# Patient Record
Sex: MALE | Race: WHITE
[De-identification: names, ages, dates, MRNs, and addresses within clinical notes are randomized per-mention and may not be internally consistent; named-entity substitution may affect disease eponyms.]

---

## 2017-07-11 ENCOUNTER — HOSPITAL ENCOUNTER (OUTPATIENT)
Dept: HOSPITAL 84 - D.MRI | Age: 62
Discharge: HOME | End: 2017-07-11
Attending: FAMILY MEDICINE
Payer: MEDICARE

## 2017-07-11 VITALS — BODY MASS INDEX: 45.4 KG/M2

## 2017-07-11 DIAGNOSIS — M54.16: Primary | ICD-10-CM

## 2017-08-11 ENCOUNTER — HOSPITAL ENCOUNTER (EMERGENCY)
Dept: HOSPITAL 84 - D.ER | Age: 62
LOS: 1 days | Discharge: HOME | End: 2017-08-12
Payer: MEDICARE

## 2017-08-11 VITALS — BODY MASS INDEX: 45.4 KG/M2
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
## 2017-08-11 DIAGNOSIS — I10: ICD-10-CM

## 2017-08-11 DIAGNOSIS — S42.001A: Primary | ICD-10-CM
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
## 2017-08-11 DIAGNOSIS — E11.9: ICD-10-CM

## 2017-08-11 DIAGNOSIS — I25.10: ICD-10-CM
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
## 2017-09-13 ENCOUNTER — HOSPITAL ENCOUNTER (INPATIENT)
Dept: HOSPITAL 84 - D.ER | Age: 62
LOS: 1 days | Discharge: HOME | DRG: 249 | End: 2017-09-14
Attending: FAMILY MEDICINE | Admitting: FAMILY MEDICINE
Payer: MEDICARE

## 2017-09-13 VITALS — BODY MASS INDEX: 44.27 KG/M2 | WEIGHT: 292.1 LBS | HEIGHT: 68 IN | BODY MASS INDEX: 44.27 KG/M2

## 2017-09-13 VITALS — DIASTOLIC BLOOD PRESSURE: 63 MMHG | SYSTOLIC BLOOD PRESSURE: 109 MMHG

## 2017-09-13 DIAGNOSIS — Z95.5: ICD-10-CM

## 2017-09-13 DIAGNOSIS — D63.8: ICD-10-CM

## 2017-09-13 DIAGNOSIS — I10: ICD-10-CM

## 2017-09-13 DIAGNOSIS — R00.2: ICD-10-CM

## 2017-09-13 DIAGNOSIS — E11.65: ICD-10-CM

## 2017-09-13 DIAGNOSIS — E66.01: ICD-10-CM

## 2017-09-13 DIAGNOSIS — R00.0: ICD-10-CM

## 2017-09-13 DIAGNOSIS — K21.9: ICD-10-CM

## 2017-09-13 DIAGNOSIS — I48.2: ICD-10-CM

## 2017-09-13 DIAGNOSIS — I48.92: ICD-10-CM

## 2017-09-13 DIAGNOSIS — I25.119: Primary | ICD-10-CM

## 2017-09-13 DIAGNOSIS — Z87.891: ICD-10-CM

## 2017-09-13 LAB
ALBUMIN SERPL-MCNC: 3.1 G/DL (ref 3.4–5)
ALP SERPL-CCNC: 82 U/L (ref 46–116)
ALT SERPL-CCNC: 18 U/L (ref 10–68)
ANION GAP SERPL CALC-SCNC: 10.3 MMOL/L (ref 8–16)
BASOPHILS NFR BLD AUTO: 0.3 % (ref 0–2)
BILIRUB SERPL-MCNC: 0.4 MG/DL (ref 0.2–1.3)
BUN SERPL-MCNC: 27 MG/DL (ref 7–18)
CALCIUM SERPL-MCNC: 8.6 MG/DL (ref 8.5–10.1)
CHLORIDE SERPL-SCNC: 109 MMOL/L (ref 98–107)
CK MB SERPL-MCNC: 1.3 U/L (ref 0–3.6)
CK SERPL-CCNC: 66 UL (ref 21–232)
CK SERPL-CCNC: 70 UL (ref 21–232)
CO2 SERPL-SCNC: 26.1 MMOL/L (ref 21–32)
CREAT SERPL-MCNC: 1 MG/DL (ref 0.6–1.3)
EOSINOPHIL NFR BLD: 4.4 % (ref 0–7)
ERYTHROCYTE [DISTWIDTH] IN BLOOD BY AUTOMATED COUNT: 14.6 % (ref 11.5–14.5)
GLOBULIN SER-MCNC: 3.2 G/L
GLUCOSE SERPL-MCNC: 130 MG/DL (ref 74–106)
HCT VFR BLD CALC: 39.9 % (ref 42–54)
HGB BLD-MCNC: 13.4 G/DL (ref 13.5–17.5)
IMM GRANULOCYTES NFR BLD: 0 % (ref 0–5)
LYMPHOCYTES NFR BLD AUTO: 27.9 % (ref 15–50)
MAGNESIUM SERPL-MCNC: 1.7 MG/DL (ref 1.8–2.4)
MCH RBC QN AUTO: 30.6 PG (ref 26–34)
MCHC RBC AUTO-ENTMCNC: 33.6 G/DL (ref 31–37)
MCV RBC: 91.1 FL (ref 80–100)
MONOCYTES NFR BLD: 9.6 % (ref 2–11)
NEUTROPHILS NFR BLD AUTO: 57.8 % (ref 40–80)
OSMOLALITY SERPL CALC.SUM OF ELEC: 287 MOSM/KG (ref 275–300)
PLATELET # BLD: 186 10X3/UL (ref 130–400)
PMV BLD AUTO: 10.3 FL (ref 7.4–10.4)
POTASSIUM SERPL-SCNC: 4.4 MMOL/L (ref 3.5–5.1)
PROT SERPL-MCNC: 6.3 G/DL (ref 6.4–8.2)
RBC # BLD AUTO: 4.38 10X6/UL (ref 4.2–6.1)
SODIUM SERPL-SCNC: 141 MMOL/L (ref 136–145)
TROPONIN I SERPL-MCNC: < 0.017 NG/ML (ref 0–0.06)
TROPONIN I SERPL-MCNC: < 0.017 NG/ML (ref 0–0.06)
WBC # BLD AUTO: 8.7 10X3/UL (ref 4.8–10.8)

## 2017-09-13 NOTE — NUR
ARRIVED TO FLOOR VIA WHEELCHAIR, ACCOMPANIED BY HOSPITAL STAFF.  LEFT FOREARM
INFUSING CARDIZEM @ 15.  ORIEINTED TO UNIT AND PLACED ON TELEMETRY.  CALL
LIGHT IN REACH.  WILL CONTINUE TO MONITOR.  SEE NURSE ASSEMENT.

## 2017-09-13 NOTE — HEMODYNAMI
PATIENT:FREDI LAWSON                                    MEDICAL RECORD: [id number]
: 51                                            [address]
[id number]                                       ADMISSION DATE: 17
 
 
 Generatedon:201713:38
Patient name: FREDI LAWSON Patient #: [id number] Visit #: [id number] SSN: : 
1955 Date of
study: 2017
Page: Of
Hemodynamic Procedure Report
****************************
Patient Data
Patient Demographics
Procedure consent was obtained
First Name: FREDI           Gender: Male
Last Name: RENNY            : 1955
Middle Initial: G           Age: 62 year(s)
Patient #: [id number]       Race: Unknown
Visit #: [id number]
Accession #:
[id number]
Additional ID: [id number]
Contact details
Address: [address] [place] [address]  Phone: [phone]
State: [place]
City: [place]
Zip code: [address]
Past Medical History
Allergies: No known allergies
Admission
Admission Data
Admission Date: 2017   Admission Time: 19:05
Room #: 2115
Procedure
Procedure Types
Cath Procedure
Diagnostic Procedure
LHC
C w/Coronaries
FFR/IVUS
Intra-Coronary IVUS Initial
PCI Procedure
Coronary Stent Initial
Miscellaneous Procedures
Moderate Sedation up to 30 minutes
Procedure Description
Procedure Date
Procedure Date: 2017
Procedure Start Time: 13:19
Procedure End Time: 13:38
Procedure Staff
Name                            Function
Fredy Roman MD                Performing Physician
Yamileth Heart RT               Scrub
Anish Rasmussen RT                  Scrub
Kyle Morin RN                Nurse
Inge Perez RT             Monitor
Procedure Data
 
Cath Procedure
Fluoroscopy
Diagnostic fluoroscopy      Total fluoroscopy Time: 3
time: 3 min                 min
Diagnostic fluoroscopy      Total fluoroscopy dose:
dose: 1478 mGy              1478 mGy
Contrast Material
Contrast Material Type                       Amount (ml)
Isovue 300                                   89
Entry Location
Entry     Primary  Successful  Side  Size  Upsize Upsize Entry    Closure Succes
sful  Closure
Location                             (Fr)  1 (Fr) 2 (Fr) Remarks  Device        
      Remarks
Femoral                        Right 5 Fr  6 Fr                   Exoseal
artery                                     Short
Estimated blood loss: 10 ml
Diagnostic catheters
Device Type               Used For           End Catheter
Placement
Cordis 5Fr Pigtail        LV Angiography
Catheter (MP)
Cordis 5Fr JL 4.0         Left Coronary
Catheter (MP)             Angiography
Cordis 5Fr 3DRC Catheter  Right Coronary
(MP)                      Angiography
Procedure Complications
No complications
Procedure Medications
Medication           Administration Route Dosage
Oxygen               NC                   2 l/min
Heparin Flush Bag    added to field       2 bags
(1000units/500ml NS)
0.9% NaCl            I.V.                 100 ml/hr
Plavix               P.O.                 600 mg
Fentanyl             I.V.                 50 mcg
Versed               I.V.                 1 mg
Fentanyl             I.V.                 50 mcg
Versed               I.V.                 1 mg
Integrilin (Bolus    I.V.                 11.3 ml
2mg/ml)
Integrilin (Bolus    wasted               8.7 ml
2mg/ml)
Heparin Bolus        I.V.                 4000 units
Hemodynamics
Rest
Heart Rate: 57 (bpm)
Snapshots
Pre Cath      Intra         NCS           Post Cath
Vital Signs
Time     Heart  Resp   SPO2 etCO2   VQ1hqdd NIBP (mmHg) Rhythm  Pain    Sedation
Rate   (ipm)  (%)  (mmHg)  (mmHg)                      Status  Level
(bpm)
13:12:46 56     18     98   0       0       127/74(93)  NSR     0 (11)  10(A)
, No
pain
13:17:31 56     19     97   0       0       137/77(98)  NSR     0 (11)  9(A)
, No
pain
13:22:12 53     18     96   0       0       114/68(86)  NSR     0 (11)  9(A)
 
, No
pain
13:26:55 52     18     94   0       0       134/71(96)  NSR     0 (11)  9(A)
, No
pain
13:31:39 50     17     95   0       0       124/63(93)  NSR     0 (11)  9(A)
, No
pain
13:36:24 51     15     91   0       0       128/77(108) NSR     0 (11)  9(A)
, No
pain
Medications
Time     Medication       Route  Dose  Verified Delivered Reason          Notes 
 Effectiveness
by       by
13:12:11 Oxygen           NC     2     Kyle Wright    Per physician
l/min Mikel Morin RN
RN
13:12:20 Heparin Flush    added  2     Kyle Wright    used for
Bag              to     bags  Mikel Morin RN procedure
(1000units/500ml field        RN
NS)
13:12:29 0.9% NaCl        I.V.   100   Kyle Wright    Per physician
ml/hr Mikel Morin RN
RN
13:12:40 Plavix           P.O.   600   Kyle Wright    for
mg    Morin   Morin RN antiplatelet
RN                 therapy
13:16:36 Fentanyl         I.V.   50    Kyle   Kyle    for sedation
mcg   Mikel Morin RN
RN
13:16:43 Versed           I.V.   1 mg  Kyle   Kyle    for sedation
Mikel Morin RN
RN
13:22:39 Fentanyl         I.V.   50    Kyle   Kyle    for sedation
mcg   Mikel Morin RN
RN
13:22:44 Versed           I.V.   1 mg  Kyle   Kyle    for sedation
Mikel Morin RN
RN
13:30:45 Heparin Bolus    I.V.   4000  Kyle   Kyle    for
units Mikel Morin RN anticoagulation
RN
13:31:40 Integrilin       I.V.   11.3  Kyle   Kyle    for
(Bolus 2mg/ml)          ml    Mikel Morin RN antiplatelet
RN                 therapy
13:31:52 Integrilin       wasted 8.7   Kyle   Kyle    for
(Bolus 2mg/ml)          ml    Mikel Morin RN antiplatelet
RN                 therapy
Procedure Log
Time     Note
12:50:02 Anish Rasmussen RT(R) sent for patient. Start room use.
13:03:11 Time tracking: Regular hours
13:03:16 Plan of Care:Hemodynamics will remain stable., Cardiac
rhythm will remain stable., Comfort level will be
maintained., Respiratory function will remain
adequate., Patient/ family verbilizes understanding of
procedure., Procedure tolerated without complication.,
Recovers from procedure without complications..
13:10:56 Patient received from PCU to CCL 1 Alert and oriented.
 
Tansferred to table in Supine position.
13:10:57 Warm blankets applied, and marisa hugger turned on for
patient comfort.
13:10:57 Correct patient and procedure confirmed by team.
13:10:58 Signed procedure consent form obtained from patient.
13:10:59 ECG and BP/O2 sat monitors applied to patient.
13:11:00 Vital chart was started
13:11:03 Rhythm: sinus rhythm
13:11:05 Full Disclosure recording started
13:11:18 H&P Date Dictated: 2017 Within 30 days and on
chart..
13:11:19 Pre-procedure instructions explained to patient.
13:11:20 Pre-op teaching completed and patient verbalized
understanding.
13:11:21 Family in waiting room.
13:11:23 Patient NPO since Midnight.
13:11:30 Patient allergic to No known allergies
13:11:33 Is the patient allergic to Iodine/contrast media? No.
13:11:35 Is patient on blood thinner?No
13:11:41 Patient diabetic? No.
13:11:44 Previous problem with sedation/anesthesia? No ?
13:11:45 Snore? Yes
13:11:46 Sleep apnea? Yes
13:11:47 Deviated septum? No
13:11:47 Opens mouth fully? Yes
13:11:48 Sticks out tongue? Yes
13:11:50 Airway obstruction? No ?
13:11:51 Dentures? No ?
13:11:54 Pre procedure: right dorsailis pedis pulse 2+ Normal;
easily identifiable; not easily obliterated
13:11:56 Patient pain scale 0/10 ?.
13:12:05 IV patent on arrival in left forearm with 0.9% NaCl at
[facility].
13:12:10 Lab results completed and on chart.
13:12:11 Oxygen 2 l/min NC was administered by Kyle Morin
RN; Per physician;
13:12:14 Right groin area was prepped with chlora-prep and
draped in sterile fashion
13:12:14 Alarms reviewed by R. N.
13:12:15 Sharps counted by scrub and verified by R.N.
13:12:18 Use device set Femoral Dx
13:12:19 Acist Syringe opened to sterile field.
13:12:20 Heparin Flush Bag (1000units/500ml NS) 2 bags added to
field was administered by Kyle Morin RN; used for
procedure;
13:12:26 Bag Decanter opened to sterile field.
13:12:26 Medline Cath Pack opened to sterile field.
13:12:27 Terumo 5Fr [place] Sheath opened to sterile field.
13:12:27 St Gurjit 260cm J .035 wire opened to sterile field.
13:12:28 Acist Hand Control opened to sterile field.
13:12:29 0.9% NaCl 100 ml/hr I.V. was administered by Kyle Morin RN; Per physician;
13:12:29 Acist Manifold opened to sterile field.
13:12:29 Diagnostic Infinity 5Fr Multipack catheter opened to
sterile field.
13:12:30 Tegaderm 4 x 4 opened to sterile field.
13:12:40 Plavix 600 mg P.O. was administered by Kyle Morin
RN; for antiplatelet therapy;
13:14:33 Final Timeout: patient, procedure, and site verified
with staff and physician. All members of the team are
 
in agreement.
13:14:38 Right groin site verified by team.
13:14:40 Physical assessment completed. ASA score P 2 - A
patient with mild systemic disease as per Fredy Roman MD.
13:14:43 Sedation plan: IV Moderate Sedation Versed, Fentanyl
13:16:26 Baseline sample Acquired.
13:16:28 Zero performed for pressure channel P1
13:16:36 Fentanyl 50 mcg I.V. was administered by Kyle Morin
RN; for sedation;
::43 Versed 1 mg I.V. was administered by Kyle Morin RN;
for sedation;
13:19:35 Procedure started.
13:19:38 Local anesthetic to right femoral artery with
Lidocaine 2% by Fredy Roman MD.**INITIAL ACCESS
ONLY**
13:20:08 A 5 Fr sheath was inserted into the Right Femoral
artery
13:20:28 A Cordis 5Fr Pigtail Catheter (MP) was advanced over
the wire and used for LV Angiography.
13:21:00 LV gram done using ANGELO
13:21:05 Injector settings: Ml/sec: 10, Volume: 20,
13:21:17 EF : 50 %
13:21:21 Catheter removed.
13:21:39 A Cordis 5Fr JL 4.0 Catheter (MP) was advanced over
the wire and used for Left Coronary Angiography.
13:22:39 Fentanyl 50 mcg I.V. was administered by Kyle Morin
RN; for sedation;
13:22:44 Versed 1 mg I.V. was administered by Kyle Morin RN;
for sedation;
13:23:07 Catheter removed.
13:24:00 A Cordis 5Fr 3DRC Catheter (MP) was advanced over the
wire and used for Right Coronary Angiography.
13:24:35 Catheter removed.
13:24:42 Sheath upsized to a 6 Fr Short.
13:25:52 Volcano Platinum [place] IVUS Catheter opened to
sterile field.
13:25:53 Cordis 6FR XBLAD 3.5 guide catheter opened to sterile
field.
13:25:53 Terumo 6Fr [place] Sheath opened to sterile field.
13:25:54 Noble Whisper J 300cm 0.014 guide wire opened to
sterile field.
13:25:54 Merit BasixCompak Inflation Kit opened to sterile
field.
13:26:05 6 Fr XBLAD 3.5 guide catheter was inserted over the
wire
13:26:25 Whisper wire advanced.
13:26:49 IVUS catheter advanced over wire.
13:26:53 IVUS pass to LAD lesion performed.
13:28:49 IVUS catheter removed over wire.
13:30:45 Heparin Bolus 4000 units I.V. was administered by
Kyle Morin RN; for anticoagulation;
13:31:35 Inflation Number: 1 A Medtronic Integrity 2.5 X 12
stent was prepped and advanced across the Mid LAD. The
stent was deployed at 13 [place] for 0:04 (min:sec).
13:31:40 Integrilin (Bolus 2mg/ml) 11.3 ml I.V. was
administered by Kyle Morin RN; for antiplatelet
therapy;
13:31:46 Stent catheter was removed intact over wire.
13:31:46 Wire removed.
 
13:31:47 Guide catheter removed.
13:31:52 Integrilin (Bolus 2mg/ml) 8.7 ml wasted was
administered by Kyle Morin RN; for antiplatelet
therapy;
13:31:57 Sheath removed intact; hemostasis achieved with
Exoseal to the Right Femoral artery.
13:32:03 Procedure ended.(Physican Out)
13:34:34 Fluoroscopy time 03.00 minutes.
13:34:38 Flurop Dose total: 1478
13:34:38 Fluoroscopy dose: 1478 mGy
13:34:48 Contrast amount:Isovue 300 89ml.
13:34:51 Sharps counted by scrub and verified by R.N.
13:34:52 Insertion/operative site no bleeding no hematoma.
13:34:55 Post-op/insertion site Right Femoral artery dressed
using a 4 x 4 and Tegaderm.
13:34:58 Post right femoral artery:stable, clean and dry
13:35:01 Post Procedure Pulses reassessed and unchanged
13:35:04 Post-procedure physical assessment completed. ASA
score P 2 - A patient with mild systemic disease as
per Fredy Roman MD.
13:35:06 Post procedure rhythm: unchanged.
13:35:08 Estimated blood loss: 10 ml
13:35:10 Post procedure instruction explained to
patient.Patient verbalizes understanding.
13:35:10 Patient needs reinforcement of post procedure
teaching.
13:35:20 Procedure Complication : No complications
13:35:23 See physician's report for complete and final results.
13:35:54 Cordis 6Fr Exoseal opened to sterile field.
13:38:12 Procedure type changed to Cath procedure, Diagnostic
procedure, LHC, LHC w/Coronaries, FFR/IVUS,
Intra-Coronary IVUS Initial, PCI procedure, Coronary
Stent Initial, Miscellaneous Procedures, Moderate
Sedation up to 30 minutes
13:38:15 Procedure and supply charges have been captured,
reviewed, submitted and are correct.
13:38:15 Vital chart was stopped
13:38:18 Report given to PCU.
13:38:22 Patient transfered to PCU with Bed.
13:38:31 Procedure ended.
13:38:31 Full Disclosure recording stopped
13:38:37 End room use (Document Last)
Intervention Summary
Intervention Notes
Time     ActionType  Lesion and  Equipment Action#  Pressure  Duration
Attributes  Used
13:31:35 Place stent Mid LAD     Medtronic 1        13        00:04
Integrity
2.5 X 12
stent
Device Usage
Item Name   Manufacture  Quantity  Catalog    Hospital Part    Current Minimal L
ot# /
Number     Charge   Number  Stock   Stock   Serial#
Code
Acist       Acist        1         [id number]      231523   735995  967761  20
Syringe     Medical
Systems Inc
Bag         Microtek     1         2002S      285043   71855   227994  5
DecEneedo    Medical Inc.
 
Medline     Cardinal     1         [id number]  283607   12592   565173  5
Cath Pack   Health
Terumo 5Fr  Terumo       1         [id number]     100797   514115  588001  40
[place]
Sheath
St Gurjit     St Gurjit      1         846255     314182   181662  524144  30
260cm J
.035 wire
Acist Hand  Acist        1         67707      647016   422936  704203  5
Control     Medical
Systems Inc
Acist       Acist        1         78044      987652   144967  014836  5
Manifold    Medical
Systems Inc
Diagnostic  Cardinal     1         [id number]     414308   55743   169567  30
Infinity    Health
5Fr
Multipack
catheter
Tegaderm 4  3M           1         1626W      712909   452279  947676  5
x 4
Cordis 5Fr  Cardinal     1                                     725209  5
Pigtail     Health
Catheter
(MP)
Cordis 5Fr  Cardinal     1                                     257008  5
JL 4.0      Health
Catheter
(MP)
Cordis 5Fr  Cardinal     1                                     395143  5
3DRC        Health
Catheter
(MP)
[place]     [place]      1         10310W     488711   628189  334206  8
Platinum
Eagleye
IVUS
Catheter
Cordis 6FR  Cardinal     1         [id number]   479520   886926  355771  10
XBLAD 3.5   Health
guide
catheter
Terumo 6Fr  Terumo       1         EVK203     072496   818706  905663  40
[place]
Sheath
Abbott      Abbott       1         [id number]  702803   207017  045265  5
[facility] J   Vascular
300cm 0.014
guide wire
Merit       Merit        1         [id number]     254169   554639  794451  15
[employer]
Inflation
Kit
Medtronic   Medtronic    1         [id number]  474096           661726  3       0
[id number]
Integrity
2.5 X 12
stent
Cordis 6Fr  Cardinal     1               317291   364012  546989  10
[facility]
 
Signature Audit [place]
Stage           Time        Signature      Unsigned
Intra-Procedure 2017   Inge
1:38:49 PM  Counts RT(R)
Signatures
Monitor : Inge     Signature :
Counts RT               ______________________________
Date : ______________ Time :
______________
 
 
 
 
 
 
 
 
 
 
 
 
 
 
 
 
 
 
 
 
 
 
 
 
 
 
 
 
 
 
 
 
 
 
 
 
 
 
 
 
 
 
 
 
 
 
[place] [address]                           
[place] [address]

## 2017-09-13 NOTE — NUR
133 FLUTTER ON ARRIVED TO FLOOR, JUST NOW CONVERTED TO 64 SR ON TELEMETRY.
WILL CONTINUE TO MONITOR.

## 2017-09-14 VITALS — SYSTOLIC BLOOD PRESSURE: 128 MMHG | DIASTOLIC BLOOD PRESSURE: 80 MMHG

## 2017-09-14 VITALS — SYSTOLIC BLOOD PRESSURE: 119 MMHG | DIASTOLIC BLOOD PRESSURE: 56 MMHG

## 2017-09-14 VITALS — SYSTOLIC BLOOD PRESSURE: 106 MMHG | DIASTOLIC BLOOD PRESSURE: 52 MMHG

## 2017-09-14 VITALS — SYSTOLIC BLOOD PRESSURE: 120 MMHG | DIASTOLIC BLOOD PRESSURE: 58 MMHG

## 2017-09-14 LAB
ANION GAP SERPL CALC-SCNC: 11.8 MMOL/L (ref 8–16)
BASOPHILS NFR BLD AUTO: 0.4 % (ref 0–2)
BUN SERPL-MCNC: 24 MG/DL (ref 7–18)
CALCIUM SERPL-MCNC: 8.5 MG/DL (ref 8.5–10.1)
CHLORIDE SERPL-SCNC: 108 MMOL/L (ref 98–107)
CK MB SERPL-MCNC: 1.2 U/L (ref 0–3.6)
CK SERPL-CCNC: 67 UL (ref 21–232)
CO2 SERPL-SCNC: 25.5 MMOL/L (ref 21–32)
CREAT SERPL-MCNC: 1.1 MG/DL (ref 0.6–1.3)
EOSINOPHIL NFR BLD: 6.1 % (ref 0–7)
ERYTHROCYTE [DISTWIDTH] IN BLOOD BY AUTOMATED COUNT: 14.9 % (ref 11.5–14.5)
GLUCOSE SERPL-MCNC: 116 MG/DL (ref 74–106)
HCT VFR BLD CALC: 38.6 % (ref 42–54)
HGB BLD-MCNC: 12.7 G/DL (ref 13.5–17.5)
IMM GRANULOCYTES NFR BLD: 0.2 % (ref 0–5)
LYMPHOCYTES NFR BLD AUTO: 28.7 % (ref 15–50)
MCH RBC QN AUTO: 30.4 PG (ref 26–34)
MCHC RBC AUTO-ENTMCNC: 32.9 G/DL (ref 31–37)
MCV RBC: 92.3 FL (ref 80–100)
MONOCYTES NFR BLD: 10.2 % (ref 2–11)
NEUTROPHILS NFR BLD AUTO: 54.4 % (ref 40–80)
OSMOLALITY SERPL CALC.SUM OF ELEC: 285 MOSM/KG (ref 275–300)
PLATELET # BLD: 203 10X3/UL (ref 130–400)
PMV BLD AUTO: 11.4 FL (ref 7.4–10.4)
POTASSIUM SERPL-SCNC: 4.3 MMOL/L (ref 3.5–5.1)
RBC # BLD AUTO: 4.18 10X6/UL (ref 4.2–6.1)
SODIUM SERPL-SCNC: 141 MMOL/L (ref 136–145)
TROPONIN I SERPL-MCNC: < 0.017 NG/ML (ref 0–0.06)
WBC # BLD AUTO: 8 10X3/UL (ref 4.8–10.8)

## 2017-09-14 PROCEDURE — B240ZZ3 ULTRASONOGRAPHY OF SINGLE CORONARY ARTERY, INTRAVASCULAR: ICD-10-PCS | Performed by: INTERNAL MEDICINE

## 2017-09-14 PROCEDURE — 4A023N7 MEASUREMENT OF CARDIAC SAMPLING AND PRESSURE, LEFT HEART, PERCUTANEOUS APPROACH: ICD-10-PCS | Performed by: INTERNAL MEDICINE

## 2017-09-14 PROCEDURE — B2111ZZ FLUOROSCOPY OF MULTIPLE CORONARY ARTERIES USING LOW OSMOLAR CONTRAST: ICD-10-PCS | Performed by: INTERNAL MEDICINE

## 2017-09-14 PROCEDURE — B2151ZZ FLUOROSCOPY OF LEFT HEART USING LOW OSMOLAR CONTRAST: ICD-10-PCS | Performed by: INTERNAL MEDICINE

## 2017-09-14 PROCEDURE — 02703DZ DILATION OF CORONARY ARTERY, ONE ARTERY WITH INTRALUMINAL DEVICE, PERCUTANEOUS APPROACH: ICD-10-PCS | Performed by: INTERNAL MEDICINE

## 2017-09-14 NOTE — NUR
RECEIVED PT IN BED EYES CLOSED RESP UNLABORED IV OF CARDIZEN INFUSING TO LFA
@5CC/HR WITHOUT DIFFICULTY SITE FREE OF REDNESS OR EDEMA

## 2017-09-14 NOTE — NUR
Patient Name: FREDI LAWSON Admission Status: ER
Accout number: [id number] Admission Date: 2017
: 1955 Admission Diagnosis:
Attending: LES SILVA Current LOS: 1
 
Anticipated DC Date: 2017
Planned Disposition: Home
Primary Insurance: [facility]
 
 
Discharge Planning Comments:
* Is the patient Alert and Oriented? Yes 0
* How many steps to enter\exit or inside your home? 3 0
* PCP DR. DELVALLE 0
* Pharmacy [facility] 0
* Preadmission Environment Home with Family 0
* ADLs Independent 0
* Equipment Cane 0
* Other Equipment NO MEDICAL EQUIPMENT PROVIDER PREFERENCE 0
* List name and contact numbers for known caregivers / representatives who
currently or will assist patient after discharge: DEYA GARCIA, SPOUSE,
[phone] 0
* Community resources currently utilized None 0
* Please name any agencies selected above. NONE 0
* Additional services required to return to the preadmission environment? No 0
* Can the patient safely return to the preadmission environment? Yes 0
* Has this patient been hospitalized within the prior 30 days at any hospital?
No 0
 
CM MET WITH PT IN ROOM TO DISCUSS DISCHARGE PLANNING AND NEEDS. PT REPORTS
LIVING AT HOME INDEPENDENTLY WITH HIS SPOUSE. PT HAS A CANE WITH NO MEDICAL
EQUIPMENT PROVIDER PREFERENCE. PT HAS NO OUTSIDE SERVICES ASSISTING IN THE
HOME. CM DISCUSSED AVAILABILITY OF HOME HEALTH, REHAB SERVICES AND MEDICAL
EQUIPMENT. PT DENIES DISCHARGE NEEDS, REPORTS HIS SPOUSE WILL PICK HIM UP FOR
DISCHARGE HOME AT 8PM TONIGHT.
 
 
: Neeraj Marquez

## 2017-09-15 NOTE — OP
PATIENT NAME:  FREDI LAWSON                             MEDICAL RECORD: [id number]
:[birth year]                                             LOCATION:D.M2     D.2115
                                                         ADMISSION DATE:17
SURGEON:  PITA CLARK MD             
 
 
DATE OF OPERATION:  2017
 
PROCEDURES:
1.  PTCA stent LAD.
2.  Intravascular ultrasound of the LAD.
3.  Left heart catheterization.
4.  Selective coronary angiography.
5.  Left ventriculogram.
 
INDICATION:  Angina and coronary artery disease.
 
PROCEDURE IN DETAIL:  After informed consent was obtained and after detailed
explanation of risks, benefits as well as alternative therapies, the patient
elected to proceed with angiogram and angioplasty.  The right femoral area was
prepped and draped in normal sterile fashion.  The right femoral artery was
cannulated via modified Seldinger technique with placement of 6-Wallisian sheath. 
All catheters exchanged through this sheath.
 
FINDINGS:  Left ventriculogram was performed in standard 30-degree ANGELO view,
reveals preserved cardiac wall motion, ejection fraction 50%.
 
SELECTIVE CORONARY ANGIOGRAPHY:
1.  Left main showed no significant angiographic disease.
2.  Left anterior descending has previously placed stents, these are widely
patent; however, there is 65% to 70% stenosis after the previously placed stents
confirmed by intravascular ultrasound.
3.  The left circumflex shows moderate irregularities, but no flow-limiting
stenosis.
4.  Right coronary is small, nondominant, moderate disease.
 
PTCA STENT OF THE LAD:  The stent used is a 2.5 x 12 mm Integrity taken to 17
atmospheres.  Result was 0% residual stenosis.
 
OVERALL IMPRESSION:  Successful percutaneous transluminal coronary angioplasty
stent of the left anterior descending going from 65-70% initial stenosis to 0%
residual stenosis.
 
TRANSINT:[id number] Voice Confirmation ID: [id number] DOCUMENT ID: [id number]
                                           
                                           PITA CLARK MD             
 
 
 
Electronically Signed by PITA CLARK on 09/15/17 at 1112
CC:                                                             [id number]
DICTATION DATE: 17 1338     :     17 2030      DIS IN  
                                                                      17
[place] [id number]

## 2017-09-15 NOTE — EC
PATIENT:FREDI LAWSON                   DATE OF SERVICE: 09/13/17
SEX: M                                  MEDICAL RECORD: [id number]
DATE OF BIRTH: [birth year]                        LOCATION:D.M2      D.211
AGE OF PATIENT: 62                             ADMISSION DATE: 09/13/17
 
REFERRING PHYSICIAN:                               
 
INTERPRETING PHYSICIAN: PITA CLARK MD             
 
 
 
                             ECHOCARDIOGRAM REPORT
  ECHO CHARGES 4               ECHO COMPLETE            
 
 
 
CLINICAL DIAGNOSIS: ATRIAL FLUTTER                
 
                         ECHOCARDIOGRAPHIC MEASUREMENTS
      (adult normal given)
   AC root (d.<3.7cm) 3.2  cm   LV Septum d (<1.2 cm> 1.4  cm
      Valve Excursion 1.6  cm     LV Septum (systole) 1.7  cm
Left Atria (s.<4.0cm> 3.5  cm          LVPW d(<1.2cm) 1.6  cm
        RV (d.<2.3cm) 4.1  cm           LVPW (sytole) 1.8  cm
  LV diastole(<5.6CM) 5.2  cm       MV E-F(>70mm/sec)      cm
           LV systole 3.7  cm           LVOT Diameter 2.0  cm
       MV exc.(>10mm) 2.1  cm
Est.ejection fraction (50-75%)     %   Pericardial Effusion N
 
   DOPPLER:
     LVIT      cm/sec A 62.0 cm/sec E 92.0  cm/sec
       LA      cm/sec      RVSP      mmHg
     LVOT 88   cm/sec   AOP1/2T 6.24 m/s
  Asc. Ao 125  cm/sec
     RVOT 99   cm/sec
       RA      cm/sec
         cm/sec
 AV Gradient Peak 3.17 mmHg  AV Mean 2.1  mmHg  AV Area 4    cm
 MV Gradient Peak 4.0  mmHg  MV Mean 0.79 mmHg  MV Area      cm
   COMMENTS:                                              
 
 
 Cardiac Sonographer: Suellen SMITH              
      Cardiologist: 2          Dr. Hernandez                
             TAPE# PACS           
                                                                                     
 
 
DATE OF SERVICE:  09/14/2017
 
Echocardiogram
 
FINDINGS:
1.  Left ventricular chamber size is within normal limits.  Left ventricular
systolic function is normal.  Overall ejection fraction estimated at 55%.
2.  Left atrium is within normal limits at 3.5 cm.  Right atrium and right
ventricular chamber sizes are mildly dilated.
3.  Valvular structures have normal structure and motion.
 
 
 
ECHOCARDIOGRAM REPORT                          [id number]    FREDI LAWSON           
 
 
4.  Doppler interrogation reveals no significant valvular insufficiency or
stenosis.
5.  No evidence of pericardial effusion or left ventricular thrombus.
 
TRANSINT:[id number] Voice Confirmation ID: [id number] DOCUMENT ID: [id number]
                                           
                                           PITA CLARK MD             
 
 
 
Electronically Signed by PITA CLARK on 09/15/17 at 1112
 
 
 
 
 
 
 
 
 
 
 
 
 
 
 
 
 
 
 
 
 
 
 
 
 
 
 
 
 
 
 
 
 
 
 
CC:                                                             [id number]
DICTATION DATE: 09/14/17 1247     :     09/14/17 1906      DIS IN  
                                                                      09/14/17
[facility]                                          
[address] [place] [address]

## 2017-10-03 ENCOUNTER — HOSPITAL ENCOUNTER (OUTPATIENT)
Dept: HOSPITAL 84 - D.ER | Age: 62
Setting detail: OBSERVATION
LOS: 2 days | Discharge: HOME | End: 2017-10-05
Attending: FAMILY MEDICINE | Admitting: FAMILY MEDICINE
Payer: MEDICARE

## 2017-10-03 VITALS — SYSTOLIC BLOOD PRESSURE: 146 MMHG | DIASTOLIC BLOOD PRESSURE: 105 MMHG

## 2017-10-03 VITALS — BODY MASS INDEX: 45.4 KG/M2 | BODY MASS INDEX: 45.5 KG/M2

## 2017-10-03 VITALS — DIASTOLIC BLOOD PRESSURE: 92 MMHG | SYSTOLIC BLOOD PRESSURE: 157 MMHG

## 2017-10-03 VITALS — DIASTOLIC BLOOD PRESSURE: 87 MMHG | SYSTOLIC BLOOD PRESSURE: 156 MMHG

## 2017-10-03 DIAGNOSIS — I25.10: ICD-10-CM

## 2017-10-03 DIAGNOSIS — E78.5: ICD-10-CM

## 2017-10-03 DIAGNOSIS — E11.65: ICD-10-CM

## 2017-10-03 DIAGNOSIS — D63.8: ICD-10-CM

## 2017-10-03 DIAGNOSIS — I48.91: Primary | ICD-10-CM

## 2017-10-03 DIAGNOSIS — E66.01: ICD-10-CM

## 2017-10-03 DIAGNOSIS — I10: ICD-10-CM

## 2017-10-03 DIAGNOSIS — Z95.5: ICD-10-CM

## 2017-10-03 LAB
ALBUMIN SERPL-MCNC: 3.4 G/DL (ref 3.4–5)
ALP SERPL-CCNC: 100 U/L (ref 46–116)
ALT SERPL-CCNC: 18 U/L (ref 10–68)
AMYLASE SERPL-CCNC: 32 U/L (ref 25–115)
ANION GAP SERPL CALC-SCNC: 14.6 MMOL/L (ref 8–16)
BASOPHILS NFR BLD AUTO: 0.2 % (ref 0–2)
BILIRUB SERPL-MCNC: 0.45 MG/DL (ref 0.2–1.3)
BUN SERPL-MCNC: 17 MG/DL (ref 7–18)
CALCIUM SERPL-MCNC: 8.9 MG/DL (ref 8.5–10.1)
CHLORIDE SERPL-SCNC: 104 MMOL/L (ref 98–107)
CHOLEST/HDLC SERPL: 2.7 RATIO (ref 2.3–4.9)
CK MB SERPL-MCNC: 1.1 U/L (ref 0–3.6)
CK MB SERPL-MCNC: 1.2 U/L (ref 0–3.6)
CK MB SERPL-MCNC: 1.4 U/L (ref 0–3.6)
CK SERPL-CCNC: 82 UL (ref 21–232)
CK SERPL-CCNC: 86 UL (ref 21–232)
CK SERPL-CCNC: 92 UL (ref 21–232)
CO2 SERPL-SCNC: 24.7 MMOL/L (ref 21–32)
CREAT SERPL-MCNC: 0.9 MG/DL (ref 0.6–1.3)
EOSINOPHIL NFR BLD: 5.3 % (ref 0–7)
ERYTHROCYTE [DISTWIDTH] IN BLOOD BY AUTOMATED COUNT: 14.2 % (ref 11.5–14.5)
GLOBULIN SER-MCNC: 3.4 G/L
GLUCOSE SERPL-MCNC: 125 MG/DL (ref 74–106)
HCT VFR BLD CALC: 42.2 % (ref 42–54)
HDLC SERPL-MCNC: 51 MG/DL (ref 32–96)
HGB BLD-MCNC: 14.3 G/DL (ref 13.5–17.5)
IMM GRANULOCYTES NFR BLD: 0.2 % (ref 0–5)
LDL-HDL RATIO: 1.2 RATIO (ref 1.5–3.5)
LDLC SERPL-MCNC: 63 MG/DL (ref 0–100)
LIPASE SERPL-CCNC: 118 U/L (ref 73–393)
LYMPHOCYTES NFR BLD AUTO: 24.8 % (ref 15–50)
MCH RBC QN AUTO: 30.8 PG (ref 26–34)
MCHC RBC AUTO-ENTMCNC: 33.9 G/DL (ref 31–37)
MCV RBC: 90.9 FL (ref 80–100)
MONOCYTES NFR BLD: 9.6 % (ref 2–11)
NEUTROPHILS NFR BLD AUTO: 59.9 % (ref 40–80)
OSMOLALITY SERPL CALC.SUM OF ELEC: 280 MOSM/KG (ref 275–300)
PLATELET # BLD: 183 10X3/UL (ref 130–400)
PMV BLD AUTO: 10.8 FL (ref 7.4–10.4)
POTASSIUM SERPL-SCNC: 4.3 MMOL/L (ref 3.5–5.1)
PROT SERPL-MCNC: 6.8 G/DL (ref 6.4–8.2)
RBC # BLD AUTO: 4.64 10X6/UL (ref 4.2–6.1)
SODIUM SERPL-SCNC: 139 MMOL/L (ref 136–145)
TRIGL SERPL-MCNC: 117 MG/DL (ref 30–200)
TROPONIN I SERPL-MCNC: < 0.017 NG/ML (ref 0–0.06)
WBC # BLD AUTO: 9.1 10X3/UL (ref 4.8–10.8)

## 2017-10-03 NOTE — NUR
ASSESSMENT COMPLETE, PT A&O.  AMBULATING IN ROOM, GAIT STEADY.  VITALS STABLE.
IV TO RIGHT AC SL.  SITE CLEAN AND DRY.  PT C/O HEADACHE, INFORMED PT THAT I
DIDNT HAVE ANY ORDERS FOR PAIN MEDICATION BUT I WILL CALL THE PHYSICIAN AND
ASK IF FOR SOMETHING.  NO OTHER NEEDS AT THIS TIME, WILL CONT TO MONITOR.

## 2017-10-03 NOTE — NUR
HS MEDS GIVEN WITH FRESH ICE WATER, FIORICET 1 TAB GIVEN FOR HEADACHE, RATES
PAIN AT AN 8 ON PAIN SCALE.

## 2017-10-04 VITALS — DIASTOLIC BLOOD PRESSURE: 103 MMHG | SYSTOLIC BLOOD PRESSURE: 128 MMHG

## 2017-10-04 VITALS — SYSTOLIC BLOOD PRESSURE: 127 MMHG | DIASTOLIC BLOOD PRESSURE: 72 MMHG

## 2017-10-04 VITALS — DIASTOLIC BLOOD PRESSURE: 72 MMHG | SYSTOLIC BLOOD PRESSURE: 113 MMHG

## 2017-10-04 VITALS — DIASTOLIC BLOOD PRESSURE: 85 MMHG | SYSTOLIC BLOOD PRESSURE: 139 MMHG

## 2017-10-04 VITALS — SYSTOLIC BLOOD PRESSURE: 131 MMHG | DIASTOLIC BLOOD PRESSURE: 76 MMHG

## 2017-10-04 VITALS — SYSTOLIC BLOOD PRESSURE: 127 MMHG | DIASTOLIC BLOOD PRESSURE: 89 MMHG

## 2017-10-04 LAB
ANION GAP SERPL CALC-SCNC: 11.3 MMOL/L (ref 8–16)
BASOPHILS NFR BLD AUTO: 0.2 % (ref 0–2)
BUN SERPL-MCNC: 19 MG/DL (ref 7–18)
CALCIUM SERPL-MCNC: 8.9 MG/DL (ref 8.5–10.1)
CHLORIDE SERPL-SCNC: 107 MMOL/L (ref 98–107)
CK MB SERPL-MCNC: 0.9 U/L (ref 0–3.6)
CK SERPL-CCNC: 87 UL (ref 21–232)
CO2 SERPL-SCNC: 27 MMOL/L (ref 21–32)
CREAT SERPL-MCNC: 0.9 MG/DL (ref 0.6–1.3)
EOSINOPHIL NFR BLD: 5.5 % (ref 0–7)
ERYTHROCYTE [DISTWIDTH] IN BLOOD BY AUTOMATED COUNT: 14.2 % (ref 11.5–14.5)
GLUCOSE SERPL-MCNC: 119 MG/DL (ref 74–106)
HCT VFR BLD CALC: 42.6 % (ref 42–54)
HGB BLD-MCNC: 14.4 G/DL (ref 13.5–17.5)
IMM GRANULOCYTES NFR BLD: 0.1 % (ref 0–5)
LYMPHOCYTES NFR BLD AUTO: 30 % (ref 15–50)
MCH RBC QN AUTO: 30.6 PG (ref 26–34)
MCHC RBC AUTO-ENTMCNC: 33.8 G/DL (ref 31–37)
MCV RBC: 90.6 FL (ref 80–100)
MONOCYTES NFR BLD: 9.2 % (ref 2–11)
NEUTROPHILS NFR BLD AUTO: 55 % (ref 40–80)
OSMOLALITY SERPL CALC.SUM OF ELEC: 283 MOSM/KG (ref 275–300)
PLATELET # BLD: 194 10X3/UL (ref 130–400)
PMV BLD AUTO: 11 FL (ref 7.4–10.4)
POTASSIUM SERPL-SCNC: 4.3 MMOL/L (ref 3.5–5.1)
RBC # BLD AUTO: 4.7 10X6/UL (ref 4.2–6.1)
SODIUM SERPL-SCNC: 141 MMOL/L (ref 136–145)
TROPONIN I SERPL-MCNC: < 0.017 NG/ML (ref 0–0.06)
WBC # BLD AUTO: 8.9 10X3/UL (ref 4.8–10.8)

## 2017-10-04 NOTE — NUR
ASSESSMENT COMPLETE, A&O.  RESPERATIONS EVEN ON RA.  IV TO RIGHT AC SL, SITE
CLEAN AND DRY.  PT DENIES PAIN OR NEEDS, BED LOW, CL IN REACH.

## 2017-10-04 NOTE — CN
PATIENT NAME:FREDI LAWSON                                 MEDICAL RECORD: [id number]
: 51                                              LOCATION:[facility] D.2119
ADMIT DATE: 10/03/17                                       ACCOUNT: [id number]
CONSULTING PHYSICIAN:    YADIRA EMERY MD          
                                               
REFERRING PHYSICIAN:     DELONTE PIERRE MD               
 
 
DATE OF CONSULTATION:  10/04/2017
 
HISTORY OF PRESENT ILLNESS:  A 62-year-old gentleman with known history of
coronary artery disease, status post recent intervention to the LAD, has a
history of atrial fibrillation.  He was admitted with chest pain, dyspnea, found
to have AFib with RVR.  Cardiac enzymes are currently negative.  He recently had
his sotalol decreased secondary to the bradyarrhythmias.  He has been
cardioverted in the past.  We are asked to see him concerning his cardiovascular
status.
 
PAST MEDICAL HISTORY:  Includes:
1.  History of coronary artery disease as described above.
2.  Hypertension.
3.  Hyperlipidemia.
4.  Diabetes mellitus.
 
ALLERGIES:  None known.
 
MEDICATIONS:  Include metformin 1 gram b.i.d., Zantac 150 q. day, aspirin 81 q.
day, sotalol 40 b.i.d., pravastatin 20 q.h.s., lisinopril 10 q. day, Plavix 75
q. day.
 
SOCIAL HISTORY:  He is a nonsmoker, nondrinker.  He takes care of all his ADLs.
 
REVIEW OF SYSTEMS:  The patient reports easy bruising but reports no swollen
glands. The patient reports no fever, no night sweats, no significant weight
gain, no significant weight loss.  No significant exercise tolerance.  The
patient reports no dry eyes, no irritation, no vision change.  Patient reports
no difficulty hearing and no ear pain.  Patient reports no frequent nose bleeds
or nose and sinus problems.  Patient reports on arm pain on exertion.   No
shortness of breath while lying down.  No history of heart murmur.  Patient
reports no cough, no wheezing or coughing up blood.  Patient reports no
abdominal pain, no vomiting.  Normal appetite.  No diarrhea and not vomiting
blood.  No nausea and no constipation.  Patient reports no incontinence.  No
difficulty urinating.  No hematuria.  No increased frequency.  Patient reports
no muscle aches.  No weakness, no arthralgias, no back pain.  No swelling of the
extremities.  Patient reports no abnormal mole, no jaundice, no rashes.  Reports
no loss of consciousness.  No weakness and no numbness.  No seizures, dizziness,
or headaches.  The patient reports no depression, no sleep disturbance, feeling
safe in a relationship and no alcohol abuse.  Patient reports on fatigue. 
Reports no runny nose or sinus pressure.  No itching, no hives, and no frequent
sneezing.  
 
PHYSICAL EXAMINATION:
GENERAL:  Middle-aged gentleman, in no acute distress.
HEENT:  Normocephalic and atraumatic.
NECK:  No bruits are noted.
HEART:  Irregular, rate is 118.
LUNGS:  Fair air excursion.
ABDOMEN:  Soft and nontender.
 
 
 
CONSULT REPORT                                 [id number]    FREDI LAWSON               
 
 
EXTREMITIES:  Pulse 2+ with no edema.
 
DIAGNOSTIC DATA:  ECG concerning for atrial fibrillation with RVR.  At this
point in time, I will increase sotalol.  Given the length of the episode, we
will need placed on anticoagulation and start Xarelto for this purpose.  May
need cardioversion in the future.  Given the previous history of
bradyarrhythmias and question of 3 sick sinus syndrome, may require pacer at
some point.
 
TRANSINT:[id number] Voice Confirmation ID: [id number] DOCUMENT ID: [id number]
                                           
                                           YADIRA EMERY MD          
 
 
 
Electronically Signed by YADIRA EMERY on 10/04/17 at 1450
 
 
 
 
 
 
 
 
 
 
 
 
 
 
 
 
 
 
 
 
 
 
 
 
 
 
 
 
 
 
 
CC:                                                             [id number]
DICTATION DATE: 10/04/17 0855     :     10/04/17 1125      ADM IN  
                                                                              
[place] [id number] [place] [id number]

## 2017-10-04 NOTE — NUR
Patient Name: FREDI LAWSON Admission Status: ER
Accout number: [id number] Admission Date: 10-
: 1955 Admission Diagnosis:
Attending: DELONTE PIERRE Current LOS: 1
 
Anticipated DC Date: 10-
Planned Disposition: Home
Primary Insurance: [facility]
 
 
Discharge Planning Comments:
* Is the patient Alert and Oriented? Yes 0
* How many steps to enter\exit or inside your home? 3 0
* PCP DR. DELVALLE 0
* Pharmacy [facility] 0
* Preadmission Environment Home with Family 0
* ADLs Independent 0
* Equipment Cane
CPAP 0
* Other Equipment NO MEDICAL EQUIPMENT PROVIDER PREFERNCE 0
* List name and contact numbers for known caregivers / representatives who
currently or will assist patient after discharge: DEYA JOSE,
SIGNIFICANT OTHER, [phone]
MELLISSA LAWSON, SON, [phone] 0
* Community resources currently utilized None 0
* Please name any agencies selected above. NONE 0
* Additional services required to return to the preadmission environment? No 0
* Can the patient safely return to the preadmission environment? Yes 0
* Has this patient been hospitalized within the prior 30 days at any hospital?
No 0
 
CM RECEIVED ORDER FOR ALCOHOL OR DRUG ABUSE TREATMENT SCREENING AND
INFORMATION. CM MET WITH PT IN ROOM TO DISCUSS DISCHARGE PLANNING AND NEEDS.
PT REPORTS LIVING AT HOME INDEPENDENTLY WITH SIGNIFICANT OTHER AND ADULT SON.
PT HAS NO CANE AND CPAP WITH NO MEDICAL EQUIPMENT PROVIDER PREFERENCE. PT HAS
NO OUTSIDE SERVICES ASSISTING IN THE HOME. CM DISCUSSED AVAILABILITY OF HOME
HEALTH, REHAB SERVICES AND MEDICAL EQUIPMENT. PT DENIES DISCHARGE NEEDS,
REPORTS HIS FAMILY WILL PICK HIM UP FOR DISCHARGE HOME. CM ASKED PT ABOUT
SUBSTANCE ABUSE ISSUES. PT REPORTS HE DOES USE MARIJUANA, BUT IS NOT
INTERESTED IN QUITTING. PT WANTS TO OBTAIN A MEDICAL MARIJUANA CARD AND HAS
DISCUSSED THIS WITH THE DOCTOR WHO HAS PROVIDED HIM WITH SOME GUIDANCE. PT
DENIES DISCHARGE NEEDS, CM TO FOLLOW AND ASSIST IF NEEDED.
 
 
: Neeraj Marquez

## 2017-10-04 NOTE — NUR
HS MEDS GIVEN WITH FRESH ICE WATER, MORPHINE 4 MG GIVEN FOR C/O PAIN TO CHEST,
RATES PAIN AT A 4 ON PAIN SCALE.

## 2017-10-05 VITALS — SYSTOLIC BLOOD PRESSURE: 101 MMHG | DIASTOLIC BLOOD PRESSURE: 78 MMHG

## 2017-10-05 VITALS — DIASTOLIC BLOOD PRESSURE: 89 MMHG | SYSTOLIC BLOOD PRESSURE: 124 MMHG

## 2017-10-05 LAB
ANION GAP SERPL CALC-SCNC: 13.3 MMOL/L (ref 8–16)
BASOPHILS NFR BLD AUTO: 0.3 % (ref 0–2)
BUN SERPL-MCNC: 17 MG/DL (ref 7–18)
CALCIUM SERPL-MCNC: 8.5 MG/DL (ref 8.5–10.1)
CHLORIDE SERPL-SCNC: 104 MMOL/L (ref 98–107)
CO2 SERPL-SCNC: 24.5 MMOL/L (ref 21–32)
CREAT SERPL-MCNC: 0.9 MG/DL (ref 0.6–1.3)
EOSINOPHIL NFR BLD: 4.3 % (ref 0–7)
ERYTHROCYTE [DISTWIDTH] IN BLOOD BY AUTOMATED COUNT: 14.2 % (ref 11.5–14.5)
GLUCOSE SERPL-MCNC: 135 MG/DL (ref 74–106)
HCT VFR BLD CALC: 43.5 % (ref 42–54)
HGB BLD-MCNC: 14.7 G/DL (ref 13.5–17.5)
IMM GRANULOCYTES NFR BLD: 0.3 % (ref 0–5)
LYMPHOCYTES NFR BLD AUTO: 25.3 % (ref 15–50)
MCH RBC QN AUTO: 30.5 PG (ref 26–34)
MCHC RBC AUTO-ENTMCNC: 33.8 G/DL (ref 31–37)
MCV RBC: 90.2 FL (ref 80–100)
MONOCYTES NFR BLD: 8.6 % (ref 2–11)
NEUTROPHILS NFR BLD AUTO: 61.2 % (ref 40–80)
OSMOLALITY SERPL CALC.SUM OF ELEC: 279 MOSM/KG (ref 275–300)
PLATELET # BLD: 211 10X3/UL (ref 130–400)
PMV BLD AUTO: 11.1 FL (ref 7.4–10.4)
POTASSIUM SERPL-SCNC: 3.8 MMOL/L (ref 3.5–5.1)
RBC # BLD AUTO: 4.82 10X6/UL (ref 4.2–6.1)
SODIUM SERPL-SCNC: 138 MMOL/L (ref 136–145)
WBC # BLD AUTO: 11.1 10X3/UL (ref 4.8–10.8)

## 2017-11-05 ENCOUNTER — HOSPITAL ENCOUNTER (INPATIENT)
Dept: HOSPITAL 84 - D.ER | Age: 62
LOS: 4 days | Discharge: HOME | DRG: 287 | End: 2017-11-09
Attending: FAMILY MEDICINE | Admitting: FAMILY MEDICINE
Payer: MEDICARE

## 2017-11-05 VITALS — SYSTOLIC BLOOD PRESSURE: 134 MMHG | DIASTOLIC BLOOD PRESSURE: 71 MMHG

## 2017-11-05 VITALS
HEIGHT: 68 IN | BODY MASS INDEX: 43.21 KG/M2 | BODY MASS INDEX: 43.21 KG/M2 | WEIGHT: 285.1 LBS | HEIGHT: 68 IN | WEIGHT: 285.1 LBS

## 2017-11-05 DIAGNOSIS — I25.10: ICD-10-CM

## 2017-11-05 DIAGNOSIS — I48.0: Primary | ICD-10-CM

## 2017-11-05 DIAGNOSIS — T78.3XXA: ICD-10-CM

## 2017-11-05 DIAGNOSIS — I10: ICD-10-CM

## 2017-11-05 DIAGNOSIS — I95.9: ICD-10-CM

## 2017-11-05 DIAGNOSIS — Z95.5: ICD-10-CM

## 2017-11-05 DIAGNOSIS — D63.8: ICD-10-CM

## 2017-11-05 DIAGNOSIS — E11.65: ICD-10-CM

## 2017-11-05 DIAGNOSIS — E66.01: ICD-10-CM

## 2017-11-05 LAB
ALBUMIN SERPL-MCNC: 3.4 G/DL (ref 3.4–5)
ALP SERPL-CCNC: 94 U/L (ref 46–116)
ALT SERPL-CCNC: 18 U/L (ref 10–68)
ANION GAP SERPL CALC-SCNC: 13.7 MMOL/L (ref 8–16)
APPEARANCE UR: CLEAR
BASOPHILS NFR BLD AUTO: 0.2 % (ref 0–2)
BILIRUB SERPL-MCNC: 0.37 MG/DL (ref 0.2–1.3)
BILIRUB SERPL-MCNC: NEGATIVE MG/DL
BUN SERPL-MCNC: 29 MG/DL (ref 7–18)
CALCIUM SERPL-MCNC: 9.3 MG/DL (ref 8.5–10.1)
CHLORIDE SERPL-SCNC: 102 MMOL/L (ref 98–107)
CHOLEST/HDLC SERPL: 3 RATIO (ref 2.3–4.9)
CK MB SERPL-MCNC: 0.7 U/L (ref 0–3.6)
CK MB SERPL-MCNC: 0.7 U/L (ref 0–3.6)
CK SERPL-CCNC: 54 UL (ref 21–232)
CK SERPL-CCNC: 56 UL (ref 21–232)
CO2 SERPL-SCNC: 25.1 MMOL/L (ref 21–32)
COLOR UR: (no result)
CREAT SERPL-MCNC: 0.9 MG/DL (ref 0.6–1.3)
EOSINOPHIL NFR BLD: 5.3 % (ref 0–7)
ERYTHROCYTE [DISTWIDTH] IN BLOOD BY AUTOMATED COUNT: 13.6 % (ref 11.5–14.5)
GLOBULIN SER-MCNC: 4 G/L
GLUCOSE SERPL-MCNC: 152 MG/DL (ref 74–106)
GLUCOSE SERPL-MCNC: NEGATIVE MG/DL
HCT VFR BLD CALC: 45.8 % (ref 42–54)
HDLC SERPL-MCNC: 44 MG/DL (ref 32–96)
HGB BLD-MCNC: 15.6 G/DL (ref 13.5–17.5)
IMM GRANULOCYTES NFR BLD: 0.2 % (ref 0–5)
INR PPP: 1.14 (ref 0.85–1.17)
KETONES UR STRIP-MCNC: NEGATIVE MG/DL
LDL-HDL RATIO: 1.5 RATIO (ref 1.5–3.5)
LDLC SERPL-MCNC: 64 MG/DL (ref 0–100)
LYMPHOCYTES NFR BLD AUTO: 24.7 % (ref 15–50)
MCH RBC QN AUTO: 31 PG (ref 26–34)
MCHC RBC AUTO-ENTMCNC: 34.1 G/DL (ref 31–37)
MCV RBC: 90.9 FL (ref 80–100)
MONOCYTES NFR BLD: 8.8 % (ref 2–11)
NEUTROPHILS NFR BLD AUTO: 60.8 % (ref 40–80)
NITRITE UR-MCNC: NEGATIVE MG/ML
OSMOLALITY SERPL CALC.SUM OF ELEC: 280 MOSM/KG (ref 275–300)
PH UR STRIP: 5 [PH] (ref 5–6)
PLATELET # BLD: 210 10X3/UL (ref 130–400)
PMV BLD AUTO: 10.8 FL (ref 7.4–10.4)
POTASSIUM SERPL-SCNC: 4.8 MMOL/L (ref 3.5–5.1)
PROT SERPL-MCNC: 7.4 G/DL (ref 6.4–8.2)
PROT UR-MCNC: NEGATIVE MG/DL
PROTHROMBIN TIME: 14.5 SECONDS (ref 11.6–15)
RBC # BLD AUTO: 5.04 10X6/UL (ref 4.2–6.1)
SODIUM SERPL-SCNC: 136 MMOL/L (ref 136–145)
SP GR UR STRIP: 1.02 (ref 1–1.02)
TRIGL SERPL-MCNC: 118 MG/DL (ref 30–200)
TROPONIN I SERPL-MCNC: < 0.017 NG/ML (ref 0–0.06)
TROPONIN I SERPL-MCNC: < 0.017 NG/ML (ref 0–0.06)
UROBILINOGEN UR-MCNC: NORMAL MG/DL
WBC # BLD AUTO: 12.1 10X3/UL (ref 4.8–10.8)

## 2017-11-05 NOTE — HEMODYNAMI
PATIENT:FREDI LAWSON                                    MEDICAL RECORD: [id number]
: [birth year]                                            [id number]                                       ADMISSION DATE: 17
 
 
 Generatedon:20179:58
Patient name: FREDI LAWSON Patient #: [id number] Visit #: [id number] SSN: : 
1955 Date
of study: 2017
Page: Of
Hemodynamic Procedure Report
****************************
Patient Data
Patient Demographics
Procedure consent was obtained
First Name: FREDI           Gender: Male
Last Name: RENNY            : 1955
Middle Initial: FELA           Age: 62 year(s)
Patient #: [id number]       Race: Unknown
Visit #: [id number]
Accession #:
[id number]
Additional ID: [id number]
Contact details
Address: [address] [place] [address]  Phone: [phone]
State: [place]
City: [place]
Zip code: [address]
Past Medical History
Allergies: No known allergies
Admission
Admission Data
Admission Date: 2017   Admission Time: 18:34
Room #: D.2114
Weight (lbs.): 291.01
Weight (kg.): 132
Lab Results
Lab Result Date: 2017  Lab Result Time: 0:00
Biochemistry
Name         Units    Result                Min      Max
BUN          mg/dl    29       --(----)-*   7        18
Creatinine   mg/dl    0.9      --(-*--)--   0.6      1.3
CBC
Name         Units    Result                Min      Max
Hematocrit   %        45.8     --(-*--)--   42       54
Hemoglobin   g/dl     15.6     --(--*-)--   13.5     17.5
Procedure
Procedure Types
Cath Procedure
Diagnostic Procedure
LHC
LHC w/Coronaries
Miscellaneous Procedures
Moderate Sedation up to 15 minutes
Procedure Description
Procedure Date
Procedure Date: 2017
Procedure Start Time: 9:43
Procedure End Time: 9:57
 
Procedure Staff
Name                            Function
Purnima Rose RT                    Monitor
Casey Hernandez MD                   Performing Physician
Randall Montenegro RN              Nurse
Yamileth Heart RT               Scrub
Anish Rasmussen RT                  Monitor
Procedure Data
Cath Procedure
Fluoroscopy
Diagnostic fluoroscopy      Total fluoroscopy Time: 1.8
time: 1.8 min               min
Diagnostic fluoroscopy      Total fluoroscopy dose: 572
dose: 572 mGy               mGy
Contrast Material
Contrast Material Type                       Amount (ml)
Isovue 300                                   61
Entry Location
Entry     Primary  Successful  Side  Size  Upsize Upsize Entry    Closure Succes
sful  Closure
Location                             (Fr)  1 (Fr) 2 (Fr) Remarks  Device        
      Remarks
Femoral                        Right 5 Fr                         Exoseal
artery
Estimated blood loss: 10 ml
Diagnostic catheters
Device Type               Used For           End Catheter
Placement
Cordis 5Fr JL 4.0         Procedure
Catheter (MP)
Cordis 5Fr 3DRC Catheter  Procedure
(MP)
Cordis 5Fr Pigtail        Procedure
Catheter (MP)
Procedure Complications
No complications
Procedure Medications
Medication           Administration Route Dosage
0.9% NaCl            I.V.                 100 ml/hr
Oxygen               NC                   2 l/min
Heparin Flush Bag    added to field       2 bags
(1000units/500ml NS)
Lidocaine 2%         added to field       20
Versed               I.V.                 1 mg
Fentanyl             I.V.                 50 mcg
Fentanyl             I.V.                 50 mcg
Versed               I.V.                 1 mg
Hemodynamics
Rest
HGB: 15.6 (g/dl) Heart Rate: 54 (bpm)
Pressure Samples
Time     Site     Value (mmHg) Purpose      Heart      Use
Rate(bpm)
9:48     AO       155/83(111)  Snapshot     56
9:52     LV       158/15,28    Snapshot     66
9:53     AO       178/85(124)  Pullback     58
Gradients
Valve  Time  Site Site 2      Mean    SEP/DFP    Peak To Heart  Use
1                (mmHg)  (sec/min)  Peak    Rate
(mmHg)  (bpm)
 
Aortic 9:53  LV   AO          9       4                  58
178/85(124)
Calculations
Valve    P-P      Mean      Valve     Index  Valve    Source
Name              Gradient  Area             Flow
(cm2)
Aortic            9
9
Snapshots
Pre Cath      Intra         NCS           Post Cath
Vital Signs
Time    Heart  Resp   SPO2 etCO2   NIBP (mmHg)  Rhythm  Pain    Sedation
Rate   (ipm)  (%)  (mmHg)                       Status  Level
(bpm)
9:29:09 56     18     100  43.5    146/101(118) NSR     0 (11)  10(A)
, No
pain
9:33:53 52     22     100  38.3    153/81(111)  NSR     0 (11)  10(A)
, No
pain
9:39:29 60     20     95   33      159/72(95)   NSR     0 (11)  10(A)
, No
pain
9:44:16 54     17     98   42      161/84(118)  NSR     0 (11)  10(A)
, No
pain
9:49:03 58     19     98   38.3    138/85(114)  NSR     0 (11)  10(A)
, No
pain
9:54:28 59     18     98   42.8    155/86(117)  NSR     0 (11)  10(A)
, No
pain
Medications
Time    Medication       Route  Dose  Verified Delivered Reason     Notes  Effec
tiveness
by       by
9:32:15 0.9% NaCl        I.V.   100   Randall  Randall   Per
ml/hr Lan Montenegro   physician
RN       RN
9:33:54 Oxygen           NC     2     Randall  Randall   Per
l/min Lan Montenegro   physician
RN       RN
9:34:17 Heparin Flush    added  2     Randall  Randall   used for
Bag              to     bags  Lorigan  Lorigan   procedure
(1000units/500ml field        RN       RN
NS)
9:34:31 Lidocaine 2%     added  20ml  Randall  Randall   for local
to     vial  Lorigan  Lorigan   anesthetic
field        RN       RN
9:37:55 Versed           I.V.   1 mg  Randall  Randall   for
Lorigan  Lorigan   sedation
RN       RN
9:38:07 Fentanyl         I.V.   50    Randall  Randall   for
mcg   Lorigan  Lorigan   sedation
RN       RN
9:39:39 Fentanyl         I.V.   50    Randall  Randall   for
mcg   Lorigan  Lorigan   sedation
RN       RN
9:46:29 Versed           I.V.   1 mg  Randall  Randall   for
Lorigan  Lorigan   sedation
 
RN       RN
Procedure Log
Time     Note
8:57:19  Randall Montenegro RN sent for patient. Start room use.
8:57:20  Time tracking: Regular hours
8:57:24  Plan of Care:Hemodynamics will remain stable., Cardiac
rhythm will remain stable., Comfort level will be
maintained., Respiratory function will remain
adequate., Patient/ family verbilizes understanding of
procedure., Procedure tolerated without complication.,
Recovers from procedure without complications..
9:06:39  H&P Date Dictated: 2017 Within 30 days and on
chart..
9:07:20  Lab Result : Hemoglobin 15.6 g/dl
9:07:20  Lab Result : Hematocrit 45.8 %
9:07:20  Lab Result : BUN 29 mg/dl
9:07:20  Lab Result : Creatinine 0.9 mg/dl
9:07:27  Lab results completed and on chart.
9:15:40  Patient received from Pre/Post Procedure Room to CCL 1
Alert and oriented. Tansferred to table in Supine
position.
9:15:41  Warm blankets applied, and marisa hugger turned on for
patient comfort.
9:15:42  Correct patient and procedure confirmed by team.
9:15:44  Signed procedure consent form obtained from patient.
9:15:45  ECG and BP/O2 sat monitors applied to patient.
9:28:21  Vital chart was started
9:28:23  Baseline sample Acquired.
9:28:29  Rhythm: sinus bradycardia
9:28:30  Full Disclosure recording started
9:28:31  Pre-procedure instructions explained to patient.
9:28:32  Pre-op teaching completed and patient verbalized
understanding.
9:28:35  Family unavailable.
9:28:36  Patient NPO since Midnight.
9:28:38  Is the patient allergic to Iodine/contrast media? No.
9:28:40  Is patient on blood thinner?Yes
9:28:43  **ACC** The patient was administered the following
blood thiners within the last 24 hours: **ACC**Plavix
9:28:45  Patient diabetic? Yes.
9:28:46  If diabetic: On Metformin? Yes
9:28:50  If on Metformin: Last Dose? 2017
9:28:53  Previous problem with sedation/anesthesia? No ?
9:28:55  Snore? Yes
9:28:56  Sleep apnea? Yes
9:28:57  Deviated septum? No
9:28:58  Opens mouth fully? Yes
9:28:59  Sticks out tongue? Yes
9:29:03  Airway obstruction? No ?
9:29:07  Dentures? Yes OUT
9:29:11  Pre procedure: right dorsailis pedis pulse 1+
Palpable, but thready & weak; easily obliterated
9:29:12  Patient pain scale 0/10 ?.
9:29:17  IV patent on arrival in left forearm with 0.9% NaCl at
KVO.
9:29:23  Right groin area was prepped with chlora-prep and
draped in sterile fashion
9:29:24  Alarms reviewed by R. N.
9:29:25  Sharps counted by scrub and verified by R.N.
9:30:35  Use device set Femoral Dx
 
9:30:36  Tegaderm 4 x 4 opened to sterile field.
9:30:37  Acist Manifold opened to sterile field.
9:30:37  Acist Hand Control opened to sterile field.
9:30:39  Bag Decanter opened to sterile field.
9:30:39  Acist Syringe opened to sterile field.
9:30:40  St Gurjit 260cm J .035 wire opened to sterile field.
9:30:40  Terumo 5Fr Morrow Sheath opened to sterile field.
9:30:40  Medline Cath Pack opened to sterile field.
9:30:41  Diagnostic Infinity 5Fr Multipack catheter opened to
sterile field.
9:32:15  0.9% NaCl 100 ml/hr I.V. was administered by Randall Montenegro RN; Per physician;
9:33:54  Oxygen 2 l/min NC was administered by Randall Montenegro
RN; Per physician;
9:34:17  Heparin Flush Bag (1000units/500ml NS) 2 bags added to
field was administered by Randall Montenegro RN; used for
procedure;
9:34:31  Lidocaine 2% 20ml vial added to field was administered
by Randall Montenegro RN; for local anesthetic;
9:37:02  --------ALL STOP TIME OUT------
9:37:03  Final Timeout: patient, procedure, and site verified
with staff and physician. All members of the team are
in agreement.
9:37:06  Right groin site verified by team.
9:37:09  Physical assessment completed. ASA score P 2 - A
patient with mild systemic disease as per Casey Hernandez MD.
9:37:12  Sedation plan: IV Moderate Sedation Versed, Fentanyl
9:37:55  Versed 1 mg I.V. was administered by Randall Montenegro
RN; for sedation;
9:38:07  Fentanyl 50 mcg I.V. was administered by Randall Montenegro RN; for sedation;
9:39:39  Fentanyl 50 mcg I.V. was administered by Randall Montenegro RN; for sedation;
9:43:55  Procedure started.
9:43:59  Local anesthetic to right femoral artery with
Lidocaine 2% by Casey Hernandez MD.**INITIAL ACCESS ONLY**
9:45:24  Zero performed for pressure channel P1
9:46:29  Versed 1 mg I.V. was administered by Randall Montenegro
RN; for sedation;
9:46:36  Cook 18G 7cm Percutaneous Entry needle opened to
sterile field.
9:47:03  A 5 Fr sheath was inserted into the Right Femoral
artery
9:47:44  A Cordis 5Fr JL 4.0 Catheter (MP) was advanced over
the wire and used for Procedure.
9:48:24  LCA angiography performed.
9:48:55  Catheter exchanged over wire.
9:49:00  A Cordis 5Fr 3DRC Catheter (MP) was advanced over the
wire and used for Procedure.
9:49:12  Patient Weight : 291.01 lbs
9:50:39  RCA angiography performed.
9:51:03  Catheter exchanged over wire.
9:51:57  A Cordis 5Fr Pigtail Catheter (MP) was advanced over
the wire and used for Procedure.
9:53:05  LV angiography performed.
9:53:06  LV gram done using ANGELO
9:53:14  EF : 50 %
9:53:18  LV hemodynamics recorded.
9:53:56  Injector settings: Ml/sec: 10, Volume: 20,
 
9:53:59  Catheter removed.
9:54:06  Cordis 5Fr Exoseal opened to sterile field.
9:54:19  Sheath removed intact; hemostasis achieved with
Exoseal to the Right Femoral artery.
9:54:21  Procedure ended.(Physican Out)
9:54:50  Fluoroscopy time 01.80 minutes.
9:54:54  Fluoroscopy dose: 572 mGy
9:54:54  Flurop Dose total: 572
9:55:09  Contrast amount:Isovue 300 61ml.
9:55:11  Sharps counted by scrub and verified by R.N.
9:55:12  Insertion/operative site no bleeding no hematoma.
9:55:15  Post-op/insertion site Right Femoral artery dressed
using a 4 x 4 and Tegaderm.
9:55:16  Post Procedure Pulses reassessed and unchanged
9:55:19  Post-procedure physical assessment completed. ASA
score P 2 - A patient with mild systemic disease as
per Casey Hernandez MD.
9:55:21  Post procedure rhythm: unchanged.
9:55:24  Estimated blood loss: 10 ml
9:55:26  Patient needs reinforcement of post procedure
teaching.
9:55:26  Post procedure instruction explained to
patient.Patient verbalizes understanding.
9:55:35  Procedure and supply charges have been captured,
reviewed, submitted and are correct.
9:55:38  Procedure Complication : No complications
9:57:03  Vital chart was stopped
9:57:04  See physician's report for complete and final results.
9:57:06  Report given to PCU.
9:57:09  Patient transfered to PCU with Bed.
9:57:11  Full Disclosure recording stopped
9:57:11  Procedure ended.
9:57:15  End room use (Document Last)
Device Usage
Item Name    Manufacture  Quantity  Catalog    Hospital Part    Current Minimal 
Lot# /
Number     Charge   Number  Stock   Stock   Serial#
Code
Tegaderm 4 x 3M           1         [id number]      [id number]   [id number]  [id number]  5
4
Acist Hand   Acist        1         27363      308708   764160  000664  5
Control      [employer]
Acist        Acist        1         87060      310933   310232  021201  5
Manifold     [employer]
Acist        Acist        1         26249      562520   095367  129633  20
Syringe      [employer]
Bag Decanter Microtek     1         2002S      249041   08215   605472  5
Medical Inc.
Medline Cath Cardinal     1         [id number]  090466   34708   860620  5
Pack         Health
Terumo 5Fr   Terumo       1         [id number]     918644   408907  185216  40
Morrow
Sheath
St Gurjit      St Gurjit      1         212802     761655   120715  847224  30
260cm J .035
wire
Diagnostic   Cardinal     1         [id number]     774672   89651   506310  30
 
Infinity 5Fr Health
Multipack
catheter
Cook 18G 7cm Cook Medical 1         [id number]     090997   38652   943261  5
Percutaneous
Entry needle
Cordis 5Fr   Cardinal     1                                     [id number]  5
JL 4.0       Health
Catheter
(MP)
Cordis 5Fr   Cardinal     1                                     [id number]  5
3DRC         Health
Catheter
(MP)
Cordis 5Fr   Cardinal     1                                     834558  5
Pigtail      Health
Catheter
(MP)
Cordis 5Fr   Cardinal     1               227384   402410  542992  10
Atmosferiq
Signature Audit Sandy
Stage           Time        Signature      Unsigned
Intra-Procedure 2017   Anish Rasmussen
9:58:24 AM  RT(R)
Signatures
Monitor : Purnima Rose RT   Signature :
______________________________
Date : ______________ Time :
______________
Monitor : Anish Rasmussen RT Signature :
______________________________
Date : ______________ Time :
______________
 
 
 
 
 
 
 
 
 
 
 
 
 
 
 
 
 
 
 
 
 
 
[address] [place] [address]

## 2017-11-05 NOTE — NUR
PT ARRIVED VIA W/C FROM ER.  NO DISTRESS NOTED.  PT DENIES ANY DISCOMFORT.
WILL CONTINUE TO MONITOR.

## 2017-11-05 NOTE — NUR
ADMISSION ASSESSMENT, HISTORY AND HOME MED LIST COMPLETED BY 2050 HRS.  VSS.
SB PER CM HR 54.  PT DENIED ANY DISCOMFORT.  IV TO L HAND WITH NS AT 75CC/HR.
IV PATENT.  RASH NOTED TO UPPER ANTERIOR CHEST.  PT STATED HE HAS HAD IT FOR A
FEW MONTHS.  O2 2LNC. LUNGS DIMINISHED IN BASES BILAT.  PT CURRENTLY RESTING
WITH EYES CLOSED.  RESP EVEN AND REGULAR.  SR UP X2, CALL LIGHT WITHIN REACH.

## 2017-11-06 VITALS — DIASTOLIC BLOOD PRESSURE: 52 MMHG | SYSTOLIC BLOOD PRESSURE: 128 MMHG

## 2017-11-06 VITALS — DIASTOLIC BLOOD PRESSURE: 88 MMHG | SYSTOLIC BLOOD PRESSURE: 145 MMHG

## 2017-11-06 VITALS — DIASTOLIC BLOOD PRESSURE: 82 MMHG | SYSTOLIC BLOOD PRESSURE: 135 MMHG

## 2017-11-06 VITALS — DIASTOLIC BLOOD PRESSURE: 53 MMHG | SYSTOLIC BLOOD PRESSURE: 140 MMHG

## 2017-11-06 VITALS — SYSTOLIC BLOOD PRESSURE: 148 MMHG | DIASTOLIC BLOOD PRESSURE: 62 MMHG

## 2017-11-06 VITALS — SYSTOLIC BLOOD PRESSURE: 135 MMHG | DIASTOLIC BLOOD PRESSURE: 71 MMHG

## 2017-11-06 LAB
ANION GAP SERPL CALC-SCNC: 14.2 MMOL/L (ref 8–16)
BUN SERPL-MCNC: 25 MG/DL (ref 7–18)
CALCIUM SERPL-MCNC: 9.1 MG/DL (ref 8.5–10.1)
CHLORIDE SERPL-SCNC: 105 MMOL/L (ref 98–107)
CK MB SERPL-MCNC: 0.8 U/L (ref 0–3.6)
CK MB SERPL-MCNC: 1 U/L (ref 0–3.6)
CK SERPL-CCNC: 47 UL (ref 21–232)
CK SERPL-CCNC: 55 UL (ref 21–232)
CO2 SERPL-SCNC: 25.7 MMOL/L (ref 21–32)
CREAT SERPL-MCNC: 0.9 MG/DL (ref 0.6–1.3)
ERYTHROCYTE [DISTWIDTH] IN BLOOD BY AUTOMATED COUNT: 13.8 % (ref 11.5–14.5)
GLUCOSE SERPL-MCNC: 123 MG/DL (ref 74–106)
HCT VFR BLD CALC: 42.8 % (ref 42–54)
HGB BLD-MCNC: 14.4 G/DL (ref 13.5–17.5)
LYMPHOCYTES NFR BLD AUTO: 29.4 % (ref 15–50)
MCH RBC QN AUTO: 30.6 PG (ref 26–34)
MCHC RBC AUTO-ENTMCNC: 33.6 G/DL (ref 31–37)
MCV RBC: 90.9 FL (ref 80–100)
NEUTROPHILS NFR BLD AUTO: 55.5 % (ref 40–80)
OSMOLALITY SERPL CALC.SUM OF ELEC: 283 MOSM/KG (ref 275–300)
PLATELET # BLD: 184 10X3/UL (ref 130–400)
PMV BLD AUTO: 10.3 FL (ref 7.4–10.4)
POTASSIUM SERPL-SCNC: 4.9 MMOL/L (ref 3.5–5.1)
RBC # BLD AUTO: 4.71 10X6/UL (ref 4.2–6.1)
SODIUM SERPL-SCNC: 140 MMOL/L (ref 136–145)
TROPONIN I SERPL-MCNC: < 0.017 NG/ML (ref 0–0.06)
TROPONIN I SERPL-MCNC: < 0.017 NG/ML (ref 0–0.06)
WBC # BLD AUTO: 9.7 10X3/UL (ref 4.8–10.8)

## 2017-11-06 PROCEDURE — B2111ZZ FLUOROSCOPY OF MULTIPLE CORONARY ARTERIES USING LOW OSMOLAR CONTRAST: ICD-10-PCS | Performed by: INTERNAL MEDICINE

## 2017-11-06 PROCEDURE — B2151ZZ FLUOROSCOPY OF LEFT HEART USING LOW OSMOLAR CONTRAST: ICD-10-PCS | Performed by: INTERNAL MEDICINE

## 2017-11-06 PROCEDURE — 4A023N7 MEASUREMENT OF CARDIAC SAMPLING AND PRESSURE, LEFT HEART, PERCUTANEOUS APPROACH: ICD-10-PCS | Performed by: INTERNAL MEDICINE

## 2017-11-06 NOTE — NUR
PT HAS BEEN GIVEN PAIN MED AND NOW XRAY IS HERE TO DO HIS LEFT SHOULDER AND
RIGHT KNEE XRAYS. PT REFUSED LEFT SHOULDER XRAY, SAYING HE REALLY THINKS IT IS
FINE. RIGHT KNEE XRAY COMPLETED. PT THEN ASSISTED UP AND TO THE BATHROOM TO
VOID, THEN BACK TO BED. CALL LIGHT IN REACH. INSTRUCTED TO CALL FOR
SUPERVISION EVERY TIME HE NEEDS TO GO TO THE BATHROOM. PT VOICED
UNDERSTANDING.

## 2017-11-06 NOTE — NUR
PERMITS FOR AM [facility] OBTAINED.  PT DECLINES SHOWER/BATH AT THIS TIME.  STATES HE
WILL DO IN A LITTLE BIT.  WILL CONTINUE TO MONITOR.

## 2017-11-06 NOTE — NUR
PT NOW C/O GENERALIZED PAIN/DISCOMFORT TO KNEE/SHOULDER AND WANTING PAIN MED.
PAGE/RETURN CALL FROM TRUDI FUENTES AND NEW ORDER FOR [facility]ES RECIEVED.

## 2017-11-06 NOTE — NUR
PAGE TO TRUDI RAINEY AND REPORTED THAT PT WAS NOW C/O LEFT SHOULDER AND
RIGHT KNEE PAIN POST FALL TODAY. ORDERS RECIEVED FOR XRAYS TO AREAS OF PAIN OR
DISCOMFORT. SPOKE WITH PATIENT AND HE SAID IT IS HIS LEFT SHOULDER AND HIS
RIGHT KNEE.

## 2017-11-07 VITALS — SYSTOLIC BLOOD PRESSURE: 137 MMHG | DIASTOLIC BLOOD PRESSURE: 76 MMHG

## 2017-11-07 VITALS — SYSTOLIC BLOOD PRESSURE: 169 MMHG | DIASTOLIC BLOOD PRESSURE: 61 MMHG

## 2017-11-07 VITALS — SYSTOLIC BLOOD PRESSURE: 156 MMHG | DIASTOLIC BLOOD PRESSURE: 63 MMHG

## 2017-11-07 VITALS — DIASTOLIC BLOOD PRESSURE: 78 MMHG | SYSTOLIC BLOOD PRESSURE: 141 MMHG

## 2017-11-07 VITALS — DIASTOLIC BLOOD PRESSURE: 74 MMHG | SYSTOLIC BLOOD PRESSURE: 146 MMHG

## 2017-11-07 VITALS — SYSTOLIC BLOOD PRESSURE: 158 MMHG | DIASTOLIC BLOOD PRESSURE: 63 MMHG

## 2017-11-07 NOTE — NUR
ASSESSMENT COMPLETE, A&O, RESPERATIONS EVEN ON O2 AT 2 LITER VIA NC.  IV TO
LEFT HAND WITH CARDIZEM AT 5 CC/HR.  SITE CLEAN  AND DRY.  139 FLUTTER ON
TELEMETRY PER MT. DRSG TO RIGHT GROIN C/D/I FROM CATH THAT WAS DONE ON MONDAY.
PEDAL PULSES PRESENT, PT DENIES PAIN OR NEEDS, BED LOW, CL IN REACH.

## 2017-11-07 NOTE — NUR
HS MEDS GIVEN WITH FRESH ICE WATER, NORCO 1 TAB GIVEN FOR C/O PAIN TO BACK AND
KNEE, RATES PAIN AT A 7 ON PAIN SCALE.  UP TO BR WITH WALKER.

## 2017-11-07 NOTE — NUR
ASSISTED UP TO VOID IN BATHROOM AND THEN BACK TO BED. PT HAS DIFFICULTY WITH
AMBULATION AND SAYS HIS RIGHT KNEE IS HURTING HIM. CPOC.

## 2017-11-08 VITALS — DIASTOLIC BLOOD PRESSURE: 95 MMHG | SYSTOLIC BLOOD PRESSURE: 146 MMHG

## 2017-11-08 VITALS — DIASTOLIC BLOOD PRESSURE: 73 MMHG | SYSTOLIC BLOOD PRESSURE: 133 MMHG

## 2017-11-08 VITALS — SYSTOLIC BLOOD PRESSURE: 108 MMHG | DIASTOLIC BLOOD PRESSURE: 74 MMHG

## 2017-11-08 VITALS — SYSTOLIC BLOOD PRESSURE: 109 MMHG | DIASTOLIC BLOOD PRESSURE: 65 MMHG

## 2017-11-08 VITALS — DIASTOLIC BLOOD PRESSURE: 53 MMHG | SYSTOLIC BLOOD PRESSURE: 97 MMHG

## 2017-11-08 VITALS — DIASTOLIC BLOOD PRESSURE: 57 MMHG | SYSTOLIC BLOOD PRESSURE: 98 MMHG

## 2017-11-08 NOTE — NUR
IV TO LEFT HAND DC'D DUE TO INFILTRATION WITH TIP INTACT. COVERED WITH 2X2 AND
TAPE. RESITED 22G IV TO LEFT WRIST WITH ONE ATTEMPT. COVERED WITH OPSITE,
DATED AND INITIALED. SECURED WITH TAPE. TOLERATED WELL

## 2017-11-08 NOTE — NUR
PT LYING IN BED ON LEFT SIDE, CNA AT BEDSIDE OBTAINING V/S. PT IS RESTING
COMFORTABLY, NO NEEDS AT THIS TIME. CONTINUE TO MONITOR CLOSELY. BED LOW, CALL
LIGHT IN REACH, SIDE RAILS X 2, HOB 20 DEGREES.

## 2017-11-09 VITALS — DIASTOLIC BLOOD PRESSURE: 64 MMHG | SYSTOLIC BLOOD PRESSURE: 130 MMHG

## 2017-11-09 VITALS — SYSTOLIC BLOOD PRESSURE: 97 MMHG | DIASTOLIC BLOOD PRESSURE: 48 MMHG

## 2017-11-09 VITALS — DIASTOLIC BLOOD PRESSURE: 61 MMHG | SYSTOLIC BLOOD PRESSURE: 117 MMHG

## 2017-11-09 VITALS — SYSTOLIC BLOOD PRESSURE: 134 MMHG | DIASTOLIC BLOOD PRESSURE: 50 MMHG

## 2017-11-09 LAB
ANION GAP SERPL CALC-SCNC: 14.5 MMOL/L (ref 8–16)
BASOPHILS NFR BLD AUTO: 0.1 % (ref 0–2)
BUN SERPL-MCNC: 28 MG/DL (ref 7–18)
CALCIUM SERPL-MCNC: 8.9 MG/DL (ref 8.5–10.1)
CHLORIDE SERPL-SCNC: 105 MMOL/L (ref 98–107)
CO2 SERPL-SCNC: 24 MMOL/L (ref 21–32)
CREAT SERPL-MCNC: 0.9 MG/DL (ref 0.6–1.3)
EOSINOPHIL NFR BLD: 5.5 % (ref 0–7)
ERYTHROCYTE [DISTWIDTH] IN BLOOD BY AUTOMATED COUNT: 13.7 % (ref 11.5–14.5)
GLUCOSE SERPL-MCNC: 134 MG/DL (ref 74–106)
HCT VFR BLD CALC: 40.8 % (ref 42–54)
HGB BLD-MCNC: 14 G/DL (ref 13.5–17.5)
IMM GRANULOCYTES NFR BLD: 0.1 % (ref 0–5)
LYMPHOCYTES NFR BLD AUTO: 28.2 % (ref 15–50)
MCH RBC QN AUTO: 31.5 PG (ref 26–34)
MCHC RBC AUTO-ENTMCNC: 34.3 G/DL (ref 31–37)
MCV RBC: 91.9 FL (ref 80–100)
MONOCYTES NFR BLD: 9.9 % (ref 2–11)
NEUTROPHILS NFR BLD AUTO: 56.2 % (ref 40–80)
OSMOLALITY SERPL CALC.SUM OF ELEC: 285 MOSM/KG (ref 275–300)
PLATELET # BLD: 162 10X3/UL (ref 130–400)
PMV BLD AUTO: 10.6 FL (ref 7.4–10.4)
POTASSIUM SERPL-SCNC: 4.5 MMOL/L (ref 3.5–5.1)
RBC # BLD AUTO: 4.44 10X6/UL (ref 4.2–6.1)
SODIUM SERPL-SCNC: 139 MMOL/L (ref 136–145)
WBC # BLD AUTO: 8.7 10X3/UL (ref 4.8–10.8)

## 2017-11-09 NOTE — NUR
Patient Name: FREDI LAWSON Admission Status: ER
Accout number: [id number] Admission Date: 2017
: 1955 Admission Diagnosis:UNSPECIFIED ATRIAL FIBRILLATION
Attending: KIMMY ORLANDO Current LOS: 2
 
Anticipated DC Date: 2017
Planned Disposition: Home
Primary Insurance: [facility]
 
 
Discharge Planning Comments:
* Is the patient Alert and Oriented? Yes 0
* How many steps to enter\exit or inside your home? 3 0
* PCP DR. DELVALLE 0
* Pharmacy [facility] 0
* Preadmission Environment Home with Family 0
* ADLs Independent 0
* Equipment Cane
CPAP
Walker 0
* Other Equipment NO EQUIPMENT PROVIDER PREFERNCE 0
* List name and contact numbers for known caregivers / representatives who
currently or will assist patient after discharge: DEYA GARCIA, SPOUSE,
[phone]
MELLISSA LAWSON, SON, [phone] 0
* Community resources currently utilized None 0
* Please name any agencies selected above. NONE 0
* Additional services required to return to the preadmission environment? No 0
* Can the patient safely return to the preadmission environment? Yes 0
* Has this patient been hospitalized within the prior 30 days at any hospital?
Yes 0
 
CM MET WITH PT IN ROOM TO DISCUSS DISCHARGE PLANNING AND NEEDS. PT REPORTS
LIVING AT HOME INDEPENDENTLY WITH SPOUSE. PT HAS CPAP, CANE AND SEVERAL
WALKERS AT HOME WITH NO MEDICAL EQUIPMENT PROVIDER PREFERENCE. PT HAS NO
OUTSIDE SERVICES ASSISTING IN THE HOME. CM DISCUSSED AVAILABILITY OF HOME
HEALTH, REHAB SERVICES AND MEDICAL EQUIPMENT. PT DENIES DISCHARGE NEEDS,
REPORTS HIS SON WILL PICK HIM UP FOR DISCHARGE HOME TODAY.
 
: Neeraj Marquez

## 2017-11-09 NOTE — NUR
reviewed discharge instructions with pt and wife both state understanding copy
given dcd saline lock to lt wrist with 22 ga saline lock intact site free of
reddness or edema pt discharged home left unit in stable condition via w/c
with all personal belongings

## 2018-07-05 ENCOUNTER — HOSPITAL ENCOUNTER (OUTPATIENT)
Dept: HOSPITAL 84 - D.CT | Age: 63
Discharge: HOME | End: 2018-07-05
Attending: CLINICAL NURSE SPECIALIST
Payer: MEDICARE

## 2018-07-05 VITALS — BODY MASS INDEX: 44.2 KG/M2

## 2018-07-05 DIAGNOSIS — Z91.81: Primary | ICD-10-CM

## 2018-07-31 ENCOUNTER — HOSPITAL ENCOUNTER (OUTPATIENT)
Dept: HOSPITAL 84 - D.MRI | Age: 63
Discharge: HOME | End: 2018-07-31
Attending: CLINICAL NURSE SPECIALIST
Payer: MEDICARE

## 2018-07-31 VITALS — BODY MASS INDEX: 44.2 KG/M2

## 2018-07-31 DIAGNOSIS — M54.2: Primary | ICD-10-CM

## 2019-02-12 ENCOUNTER — HOSPITAL ENCOUNTER (OUTPATIENT)
Dept: HOSPITAL 84 - D.ER | Age: 64
Setting detail: OBSERVATION
LOS: 2 days | Discharge: HOME | End: 2019-02-14
Attending: INTERNAL MEDICINE | Admitting: INTERNAL MEDICINE
Payer: MEDICARE

## 2019-02-12 VITALS — BODY MASS INDEX: 47.74 KG/M2 | WEIGHT: 315 LBS | HEIGHT: 68 IN | BODY MASS INDEX: 47.74 KG/M2

## 2019-02-12 DIAGNOSIS — I25.10: ICD-10-CM

## 2019-02-12 DIAGNOSIS — I48.91: ICD-10-CM

## 2019-02-12 DIAGNOSIS — R00.1: ICD-10-CM

## 2019-02-12 DIAGNOSIS — M48.02: ICD-10-CM

## 2019-02-12 DIAGNOSIS — M50.30: Primary | ICD-10-CM

## 2019-02-12 DIAGNOSIS — M51.36: ICD-10-CM

## 2019-02-12 NOTE — NUR
PT ARRIVED TO FLOOR VIA STRETCHER. TRANSFERED TO BED. PT STATES PAIN IN LOWER
BACK 3/10 BEFORE PAIN MED WAS GIVEN, AT THE MOMENT PT IS HAVING NO PAIN. PT
STATES HE HAS BEEN UNABLE TO AMBULATE SINCE FALLING ABOUT 24 HOURS AGO. IV
RIGHT HAND INFUSING NS @ 125. PT STATES NO NEEDS AT THIS TIME. ENCOURAGED PT
TO USE CL WITH NEEDS. CL IN REACH, WILL CONTINUE TO MONITOR

## 2019-02-12 NOTE — NUR
PT RESTING NO S/S OF DISTRESS NOTED. PT /70, HR 39 TO 45 WHICH IS PT'S
NORMAL ACCORDING TO EMS AND PT  AND PT WIFE. WIFE STATES PT HAS BEEN TO SEE
CARDIOLOGIST AND WAS TOLD IT IS OK FOR HIS HEART RATE TO RUN LOW AND IT WAS HIS
NORMAL.

## 2019-02-13 VITALS — DIASTOLIC BLOOD PRESSURE: 69 MMHG | SYSTOLIC BLOOD PRESSURE: 128 MMHG

## 2019-02-13 VITALS — SYSTOLIC BLOOD PRESSURE: 139 MMHG | DIASTOLIC BLOOD PRESSURE: 88 MMHG

## 2019-02-13 VITALS — SYSTOLIC BLOOD PRESSURE: 134 MMHG | DIASTOLIC BLOOD PRESSURE: 68 MMHG

## 2019-02-13 VITALS — SYSTOLIC BLOOD PRESSURE: 150 MMHG | DIASTOLIC BLOOD PRESSURE: 76 MMHG

## 2019-02-13 VITALS — SYSTOLIC BLOOD PRESSURE: 164 MMHG | DIASTOLIC BLOOD PRESSURE: 77 MMHG

## 2019-02-13 LAB
ALBUMIN SERPL-MCNC: 2.9 G/DL (ref 3.4–5)
ALP SERPL-CCNC: 78 U/L (ref 46–116)
ALT SERPL-CCNC: 29 U/L (ref 10–68)
ANION GAP SERPL CALC-SCNC: 17.1 MMOL/L (ref 8–16)
BILIRUB SERPL-MCNC: 0.44 MG/DL (ref 0.2–1.3)
BUN SERPL-MCNC: 19 MG/DL (ref 7–18)
CALCIUM SERPL-MCNC: 8.2 MG/DL (ref 8.5–10.1)
CHLORIDE SERPL-SCNC: 106 MMOL/L (ref 98–107)
CO2 SERPL-SCNC: 23.3 MMOL/L (ref 21–32)
CREAT SERPL-MCNC: 0.9 MG/DL (ref 0.6–1.3)
ERYTHROCYTE [DISTWIDTH] IN BLOOD BY AUTOMATED COUNT: 13.8 % (ref 11.5–14.5)
GLOBULIN SER-MCNC: 3 G/L
GLUCOSE SERPL-MCNC: 98 MG/DL (ref 74–106)
HCT VFR BLD CALC: 39.6 % (ref 42–54)
HGB BLD-MCNC: 13.6 G/DL (ref 13.5–17.5)
LYMPHOCYTES NFR BLD AUTO: 15.2 % (ref 15–50)
MCH RBC QN AUTO: 33.2 PG (ref 26–34)
MCHC RBC AUTO-ENTMCNC: 34.3 G/DL (ref 31–37)
MCV RBC: 96.6 FL (ref 80–100)
NEUTROPHILS NFR BLD AUTO: 76.7 % (ref 40–80)
OSMOLALITY SERPL CALC.SUM OF ELEC: 284 MOSM/KG (ref 275–300)
PLATELET # BLD: 130 10X3/UL (ref 130–400)
PMV BLD AUTO: 10.8 FL (ref 7.4–10.4)
POTASSIUM SERPL-SCNC: 4.4 MMOL/L (ref 3.5–5.1)
PROT SERPL-MCNC: 5.9 G/DL (ref 6.4–8.2)
RBC # BLD AUTO: 4.1 10X6/UL (ref 4.2–6.1)
SODIUM SERPL-SCNC: 142 MMOL/L (ref 136–145)
WBC # BLD AUTO: 8.4 10X3/UL (ref 4.8–10.8)

## 2019-02-13 NOTE — NUR
PATIENT REFUSES TO HOLD URINAL STATES HAS NO FEELING IN HANDS HE HOLDS PHONE
AND DRINKING GLASS. PT. URINATED BED AGAIN AND COMPLETE LINEN CHANGE DONE.

## 2019-02-13 NOTE — NUR
PATIENT ASKING STAFF TO HOLD PENIS SO HE CAN URINATE BUT HE CAN HOLD PHONE,
DRINKING CUP AND OTHER ITEMS, COMPLETE BED CHANGE DONE PT URINATED IN BED, THE
HE SAID HE HAD TO GO AND HELD URINAL AND HE VOIDED 100 MLS.

## 2019-02-13 NOTE — MORECARE
CASE MANAGEMENT DISCHARGE SUMMARY
 
 
PATIENT: FREDI LAWSON                       UNIT: [id number]
ACCOUNT#: [id number]                       ADM DATE: 19
AGE: 63     : [birth year]  SEX: M            ROOM/BED: D.2236    
AUTHOR: ANDER ANTHONY                             PHYSICIAN:                               
 
REFERRING PHYSICIAN: YESSENIA MILLER MD               
DATE OF SERVICE: 19
Discharge Plan
 
 
Patient Name: FREDI LAWSON
Facility: [facility]:[place]
Encounter #: [id number]
Medical Record #: [id number]
: 1955
Planned Disposition: 
Anticipated Discharge Date: 
 
Discharge Date: 
Expected LOS: 
Initial Reviewer: [id number]
Initial Review Date: 2019
Generated: 19   6:08 pm 
Comments
 
DCP- Discharge Planning
 
Updated by [email or web address]: Shirley Fong on 19   4:04 pm CT
Met with patient to discuss discharge planning, he asks me to return tomorrow 
for discharge planning. MOOM explained, signed, Will meet with him tomorrow. 
CM will continue to follow and assist with discharge planning/needs.
  
 
 
 
 
 
 
Coverage Notice
 
Reviewer: [email or web address] - Shirley Fong
 
Notice Issued Date-Time: 2019  17:04
Notice Type: Medicare Outpatient Observation Notice
 
Notice Delivered To: Patient
Relationship to Patient: Self
 
Representative Name: 
 
Delivery Method: HAND - Hand Delivered
Sandee Days:
Prior Verbal Notification: 
 
Recipient Understood Notice: Yes
Recipient Signature: Yes
Med Rec Note Co-signed by Attending:
 
Coverage Notice Comment:  IMM explained, signed, given, copy placed in MR
Patient Name: FREDI LAWSON
Encounter #: [id number]
Page [id number]
 
 
 
 
 
Electronically Signed by ANDER ANTHONY on 19 at 1708
 
 
 
 
 
 
**All edits/amendments must be made on the electronic document**
 
DICTATION DATE: 19     : GERALDINE  19     
RPT#: [id number]                                DC DATE:        
                                               STATUS: ADM IN  
[facility]
[address] [place] [address]
***END OF REPORT***

## 2019-02-14 VITALS — SYSTOLIC BLOOD PRESSURE: 152 MMHG | DIASTOLIC BLOOD PRESSURE: 102 MMHG

## 2019-02-14 VITALS — SYSTOLIC BLOOD PRESSURE: 109 MMHG | DIASTOLIC BLOOD PRESSURE: 52 MMHG

## 2019-02-14 LAB
ANION GAP SERPL CALC-SCNC: 16.8 MMOL/L (ref 8–16)
BASOPHILS NFR BLD AUTO: 0.2 % (ref 0–2)
BUN SERPL-MCNC: 18 MG/DL (ref 7–18)
CALCIUM SERPL-MCNC: 8.5 MG/DL (ref 8.5–10.1)
CHLORIDE SERPL-SCNC: 106 MMOL/L (ref 98–107)
CO2 SERPL-SCNC: 25 MMOL/L (ref 21–32)
CREAT SERPL-MCNC: 1.1 MG/DL (ref 0.6–1.3)
EOSINOPHIL NFR BLD: 0 % (ref 0–7)
ERYTHROCYTE [DISTWIDTH] IN BLOOD BY AUTOMATED COUNT: 13.5 % (ref 11.5–14.5)
GLUCOSE SERPL-MCNC: 124 MG/DL (ref 74–106)
HCT VFR BLD CALC: 45 % (ref 42–54)
HGB BLD-MCNC: 15.3 G/DL (ref 13.5–17.5)
IMM GRANULOCYTES NFR BLD: 0.1 % (ref 0–5)
LYMPHOCYTES NFR BLD AUTO: 15.2 % (ref 15–50)
MCH RBC QN AUTO: 32.4 PG (ref 26–34)
MCHC RBC AUTO-ENTMCNC: 34 G/DL (ref 31–37)
MCV RBC: 95.3 FL (ref 80–100)
MONOCYTES NFR BLD: 9.8 % (ref 2–11)
NEUTROPHILS NFR BLD AUTO: 74.7 % (ref 40–80)
OSMOLALITY SERPL CALC.SUM OF ELEC: 289 MOSM/KG (ref 275–300)
PLATELET # BLD: 160 10X3/UL (ref 130–400)
PMV BLD AUTO: 11.2 FL (ref 7.4–10.4)
POTASSIUM SERPL-SCNC: 3.8 MMOL/L (ref 3.5–5.1)
RBC # BLD AUTO: 4.72 10X6/UL (ref 4.2–6.1)
SODIUM SERPL-SCNC: 144 MMOL/L (ref 136–145)
WBC # BLD AUTO: 9 10X3/UL (ref 4.8–10.8)

## 2019-02-14 NOTE — MORECARE
CASE MANAGEMENT DISCHARGE SUMMARY
 
 
PATIENT: FREDI LAWSON                       UNIT: [id number]
ACCOUNT#: [id number]                       ADM DATE: 19
AGE: 63     : 51  SEX: M            ROOM/BED: D.2236    
AUTHOR: RAJDOC                             PHYSICIAN:                               
 
REFERRING PHYSICIAN: YESSENIA MILLER MD               
DATE OF SERVICE: 19
Discharge Plan
 
 
Patient Name: FREDI LAWSON
Facility: [facility]:[place]
Encounter #: [id number]
Medical Record #: [id number]
: 1955
Planned Disposition: Home
Anticipated Discharge Date: 
 
Discharge Date: 
Expected LOS: 
Initial Reviewer: [email or web address]
Initial Review Date: 2019
Generated: 19   2:57 pm 
Comments
 
DCP- Discharge Planning
 
Updated by [email or web address]: Shirley Fong on 19  12:52 pm CT
Patient Name: FREDI LAWSON                                     
Admission Status: ER   
Accout number: [id number]                              
Admission Date: 2019   
: 1955                                                        
Admission Diagnosis:   
Attending: YESSENIA MILLER                                                
Current LOS:  2   
  
Anticipated DC Date:    
Planned Disposition: Home   
Primary Insurance: [facility] MEDICARE SOLUTIONS   
  
  
Discharge Planning Comments: CM met with patient to discuss discharge 
planning/needs, he is alone in the room. He states he lives with his wife. 
States he is independent with a all ADL's. and AIDL'S. States he has 5 canes 
and 3 walkers and CPAP at home. States he gets his CPAP supplies from 
[employer]. States he does not have any outside community resources in the home.
 
 I questioned him about a glucometer, he states he does not have one and does 
not want one. States they are taking my sugar here and it is always good. 
Informed him that PT suggests home health. He states he does not want home 
health. States he will have Dr. Pride order it if he changes his mind. 
States "I can walk with my walker, this IV just gets in the way." CM will 
continue to follow and assist with DC planning/needs  
  
  
  
  
  
  
: Shirley Fong
DCP- Discharge Planning
 
Updated by [id number]: Shirley Fong on 19   4:04 pm CT
Met with patient to discuss discharge planning, he asks me to return tomorrow 
for discharge planning. MOOM explained, signed, Will meet with him tomorrow. 
CM will continue to follow and assist with discharge planning/needs.
 DCPIA - Discharge Planning Initial Assessment
 
Updated by [email or web address]: Shirley Fong on 19   1:45 pm
*  Is the patient Alert and Oriented?
Yes
*  How many steps to enter\exit or inside your home? 3/0 *  PCP Dr. Pride *  Pharmacy [facility]
*  Preadmission Environment
Home with Family
*  ADLs
Partial Dependent
*  Partial ADLs (Assistance needed)
Ambulation
*  Equipment
Cane
CPAP
Walker
*  List name and contact numbers for known caregivers / representatives who 
currently or will assist patient after discharge:
Charo - wife - [phone]  Vic - son - [phone]
 
*  Verbal permission to speak to the caregivers and representatives has been 
obtained from the patient.
Yes
*  Community resources currently utilized
None
*  Please name any agencies selected above.
DME company - [employer]
*  Additional services required to return to the preadmission environment?
No
*  Can the patient safely return to the preadmission environment?
Yes
*  Has this patient been hospitalized within the prior 30 days at any 
 
hospital?
No
 
 
 
 
 
Coverage Notice
 
Reviewer: [email or web address] - Shirley Fong
 
Notice Issued Date-Time: 2019  17:04
Notice Type: Medicare Outpatient Observation Notice
 
Notice Delivered To: Patient
Relationship to Patient: Self
Representative Name: 
 
Delivery Method: HAND - Hand Delivered
Sandee Days:
Prior Verbal Notification: 
 
Recipient Understood Notice: Yes
Recipient Signature: Yes
Med Rec Note Co-signed by Attending:
 
Coverage Notice Comment:  IMM explained, signed, given, copy placed in MR
 
Last DP export: 19  12:49 p
Patient Name: FREDI LAWSON
Encounter #: [id number]
Page [id number]
 
 
 
 
 
Electronically Signed by ANDER ANTHONY on 19 at 1358
 
 
 
 
 
 
**All edits/amendments must be made on the electronic document**
 
DICTATION DATE: 19 1357     : GERALDINE  19 1357     
RPT#: [id number]                                DC DATE:        
                                               STATUS: ADM IN  
[facility]
 [place] [address]
***END OF REPORT***

## 2019-02-14 NOTE — MORECARE
CASE MANAGEMENT DISCHARGE SUMMARY
 
 
PATIENT: FREDI LAWSON                       UNIT: [id number]
ACCOUNT#: [id number]                       ADM DATE: 19
AGE: 63     : 51  SEX: M            ROOM/BED: D.2236    
AUTHOR: RAJ,DOC                             PHYSICIAN:                               
 
REFERRING PHYSICIAN: YESSENIA MILLER MD               
DATE OF SERVICE: 19
Discharge Plan
 
 
Patient Name: FREDI LAWSON
Facility: [facility]:[place]
Encounter #: [id number]
Medical Record #: [id number]
: 1955
Planned Disposition: Home
Anticipated Discharge Date: 
 
Discharge Date: 
Expected LOS: 
Initial Reviewer: [id number]
Initial Review Date: 2019
Generated: 19   4:30 pm 
Comments
 
DCP- Discharge Planning
 
Updated by [id number]: Shirley Ajit on 19   2:22 pm CT
Discharging home today. Declines [facility]. States he will see his own neurologist 
in [place]. States he ambulated with walker to the bathroom without 
difficulty. States he had an MRI in , I instructed him that something 
could've changed since then and the physician has ordered an outpatient MRI 
for him. He denies need for DME. CM will continue to follow and assist with 
discharge planning/needs.
DCP- Discharge Planning
 
Updated by [id number]: Shirley Gordonnisha on 19  12:52 pm CT
Patient Name: FREDI LAWSON                                     
Admission Status: ER   
Accout number: [id number]                              
Admission Date: 2019   
: 1955                                                        
Admission Diagnosis:   
Attending: YESSENIA MILLER                                                
Current LOS:  2   
  
Anticipated DC Date:    
 
Planned Disposition: Home   
Primary Insurance: [facility] MEDICARE SOLUTIONS   
  
  
Discharge Planning Comments: CM met with patient to discuss discharge 
planning/needs, he is alone in the room. He states he lives with his wife. 
States he is independent with a all ADL's. and AIDL'S. States he has 5 canes 
and 3 walkers and CPAP at home. States he gets his CPAP supplies from 
[employer]. States he does not have any outside community resources in the home.
 I questioned him about a glucometer, he states he does not have one and does 
not want one. States they are taking my sugar here and it is always good. 
Informed him that PT suggests home health. He states he does not want home 
health. States he will have Dr. Pride order it if he changes his mind. 
States "I can walk with my walker, this IV just gets in the way." CM will 
continue to follow and assist with DC planning/needs  
  
  
  
  
  
  
: Shirley Fong
DCP- Discharge Planning
 
Updated by [id number]: Shirley Ajit on 19   4:04 pm CT
Met with patient to discuss discharge planning, he asks me to return tomorrow 
for discharge planning. MOOM explained, signed, Will meet with him tomorrow. 
CM will continue to follow and assist with discharge planning/needs.
 DCPIA - Discharge Planning Initial Assessment
 
Updated by [id number]: Shirley Ajit on 19   1:45 pm
*  Is the patient Alert and Oriented?
Yes
*  How many steps to enter\exit or inside your home? 3/0 *  PCP Dr. Pride *  Pharmacy [facility]
*  Preadmission Environment
Home with Family
*  ADLs
Partial Dependent
*  Partial ADLs (Assistance needed)
Ambulation
*  Equipment
Cane
CPAP
Walker
*  List name and contact numbers for known caregivers / representatives who 
currently or will assist patient after discharge:
Charo - wife - [phone]  Vic - son - [phone]
 
*  Verbal permission to speak to the caregivers and representatives has been 
obtained from the patient.
Yes
 
*  Community resources currently utilized
None
*  Please name any agencies selected above.
[facility] - [employer]
*  Additional services required to return to the preadmission environment?
No
*  Can the patient safely return to the preadmission environment?
Yes
*  Has this patient been hospitalized within the prior 30 days at any 
hospital?
No
 
 
 
 
 
Coverage Notice
 
Reviewer: [email or web address] - Shirley Ajit
 
Notice Issued Date-Time: 2019  17:04
Notice Type: Medicare Outpatient Observation Notice
 
Notice Delivered To: Patient
Relationship to Patient: Self
Representative Name: 
 
Delivery Method: HAND - Hand Delivered
Sandee Days:
Prior Verbal Notification: 
 
Recipient Understood Notice: Yes
Recipient Signature: Yes
Med Rec Note Co-signed by Attending:
 
Coverage Notice Comment:  IMM explained, signed, given, copy placed in MR
 
Last DP export: 19  12:58 p
Patient Name: FREDI LAWSON
 
Encounter #: [id number]
Page [id number]
 
 
 
 
 
Electronically Signed by ANDER ANTHONY on 19 at 1530
 
 
 
 
 
 
**All edits/amendments must be made on the electronic document**
 
DICTATION DATE: 19     : GERALDINE  19     
RPT#: [id number]                                DC DATE:        
                                               STATUS: ADM IN  
[facility]
 [place] [address]
***END OF REPORT***

## 2019-02-14 NOTE — MORECARE
CASE MANAGEMENT DISCHARGE SUMMARY
 
 
PATIENT: FREDI LAWSON                       UNIT: [id number]
ACCOUNT#: [id number]                       ADM DATE: 19
AGE: 63     : 51  SEX: M            ROOM/BED: D.2236    
AUTHOR: RAJ,DOC                             PHYSICIAN:                               
 
REFERRING PHYSICIAN: YESSENIA MILLER MD               
DATE OF SERVICE: 19
Discharge Plan
 
 
Patient Name: FREDI LAWSON
Facility: [facility]:[place]
Encounter #: [id number]
Medical Record #: [id number]
: 1955
Planned Disposition: Home
Anticipated Discharge Date: 
 
Discharge Date: 
Expected LOS: 
Initial Reviewer: [id number]
Initial Review Date: 2019
Generated: 19   2:49 pm 
Comments
 
DCP- Discharge Planning
 
Updated by [email or web address]: Shirley Fong on 19   4:04 pm CT
Met with patient to discuss discharge planning, he asks me to return tomorrow 
for discharge planning. MOOM explained, signed, Will meet with him tomorrow. 
CM will continue to follow and assist with discharge planning/needs.
 DCPIA - Discharge Planning Initial Assessment
 
Updated by [email or web address]: Shirley Fong on 19   1:45 pm
*  Is the patient Alert and Oriented?
Yes
*  How many steps to enter\exit or inside your home? 3/0 *  PCP Dr. Pride *  Pharmacy [facility]
*  Preadmission Environment
Home with Family
*  ADLs
Partial Dependent
*  Partial ADLs (Assistance needed)
Ambulation
*  Equipment
Cane
CPAP
 
Walker
*  List name and contact numbers for known caregivers / representatives who 
currently or will assist patient after discharge:
Charo - wife - [phone]  Vic - son - [phone]
 
*  Verbal permission to speak to the caregivers and representatives has been 
obtained from the patient.
Yes
*  Community resources currently utilized
None
*  Please name any agencies selected above.
[facility] - [employer]
*  Additional services required to return to the preadmission environment?
No
*  Can the patient safely return to the preadmission environment?
Yes
*  Has this patient been hospitalized within the prior 30 days at any 
hospital?
No
 
 
 
 
 
Coverage Notice
 
Reviewer: [email or web address] - Shirley Fong
 
Notice Issued Date-Time: 2019  17:04
Notice Type: Medicare Outpatient Observation Notice
 
Notice Delivered To: Patient
Relationship to Patient: Self
Representative Name: 
 
Delivery Method: HAND - Hand Delivered
Sandee Days:
Prior Verbal Notification: 
 
Recipient Understood Notice: Yes
Recipient Signature: Yes
Med Rec Note Co-signed by Attending:
 
Coverage Notice Comment:  IMM explained, signed, given, copy placed in MR
 
Last DP export: 19  12:36 p
Patient Name: FREDI LAWSON
 
Encounter #: [id number]
Page [id number]
 
 
 
 
 
Electronically Signed by ANDER ANTHONY on 19 at 1349
 
 
 
 
 
 
**All edits/amendments must be made on the electronic document**
 
DICTATION DATE: 19 134     : GERALDINE  19 1349     
RPT#: [id number]                                DC DATE:        
                                               STATUS: ADM IN  
[facility]
[address] [place] [address]
***END OF REPORT***

## 2019-02-14 NOTE — MORECARE
CASE MANAGEMENT DISCHARGE SUMMARY
 
 
PATIENT: FREDI LAWSON                       UNIT: [id number]
ACCOUNT#: [id number]                       ADM DATE: 19
AGE: 63     : 51  SEX: M            ROOM/BED: D.2236    
AUTHOR: ANDER ANTHONY                             PHYSICIAN:                               
 
REFERRING PHYSICIAN: YESSENIA MILLER MD               
DATE OF SERVICE: 19
Discharge Plan
 
 
Patient Name: FREDI LAWSON
Facility: [facility]:[place]
Encounter #: [id number]
Medical Record #: [id number]
: 1955
Planned Disposition: Home
Anticipated Discharge Date: 
 
Discharge Date: 
Expected LOS: 
Initial Reviewer: [id number]
Initial Review Date: 2019
Generated: 19   2:36 pm 
Comments
 
DCP- Discharge Planning
 
Updated by [id number]: Shirley Fong on 19   4:04 pm CT
Met with patient to discuss discharge planning, he asks me to return tomorrow 
for discharge planning. MOOM explained, signed, Will meet with him tomorrow. 
CM will continue to follow and assist with discharge planning/needs.
  
 
 
 
 
 
 
Coverage Notice
 
Reviewer: [email or web address] - Shirley Fong
 
Notice Issued Date-Time: 2019  17:04
Notice Type: Medicare Outpatient Observation Notice
 
Notice Delivered To: Patient
Relationship to Patient: Self
 
Representative Name: 
 
Delivery Method: HAND - Hand Delivered
Sandee Days:
Prior Verbal Notification: 
 
Recipient Understood Notice: Yes
Recipient Signature: Yes
Med Rec Note Co-signed by Attending:
 
Coverage Notice Comment:  IMM explained, signed, given, copy placed in MR
 
Last DP export: 19   4:08 p
Patient Name: FREDI LAWSON
Encounter #: [id number]
Page [id number]
 
 
 
 
 
Electronically Signed by ANDER ANTHONY on 19 at 1336
 
 
 
 
 
 
**All edits/amendments must be made on the electronic document**
 
DICTATION DATE: 19 1335     : GERALDINE  19 1335     
RPT#: [id number]                                DC DATE:        
                                               STATUS: ADM IN  
[facility]
 [place] [address]
***END OF REPORT***

## 2019-02-15 NOTE — [facility]
CASE MANAGEMENT DISCHARGE SUMMARY
 
 
PATIENT: FREDI LAWSON                       UNIT: [id number]
ACCOUNT#: [id number]                       ADM DATE: 19
AGE: 63     : [birth year]  SEX: M            ROOM/BED: D.2236    
AUTHOR: RAJ,DOC                             PHYSICIAN:                               
 
REFERRING PHYSICIAN: YESSENIA MILLER MD               
DATE OF SERVICE: 02/15/19
Discharge Plan
 
 
Patient Name: FREDI LAWSON
Facility: [facility]
Encounter #: [id number]
Medical Record #: [id number]
: 1955
Planned Disposition: Home
Anticipated Discharge Date: 
 
Discharge Date: 2019
Expected LOS: 0
Initial Reviewer: [email or web address]
Initial Review Date: 2019
Generated: 2/15/19   5:57 pm 
Comments
 
DCP- Discharge Planning
 
Updated by [email or web address]: Shirleymark Fong on 19   2:22 pm CT
Discharging home today. Declines [facility]. States he will see his own neurologist 
in [place]. States he ambulated with walker to the bathroom without 
difficulty. States he had an MRI in , I instructed him that something 
could've changed since then and the physician has ordered an outpatient MRI 
for him. He denies need for DME. CM will continue to follow and assist with 
discharge planning/needs.
DCP- Discharge Planning
 
Updated by [email or web address]: Shirley Fong on 19  12:52 pm CT
Patient Name: FREDI LAWSON                                     
Admission Status: ER   
Accout number: [id number]                              
Admission Date: 2019   
: 1955                                                        
Admission Diagnosis:   
Attending: YESSENIA MILLER                                                
Current LOS:  2   
  
Anticipated DC Date:    
 
Planned Disposition: Home   
Primary Insurance: [facility] [employer]   
  
  
Discharge Planning Comments: CM met with patient to discuss discharge 
planning/needs, he is alone in the room. He states he lives with his wife. 
States he is independent with a all ADL's. and AIDL'S. States he has 5 canes 
and 3 walkers and CPAP at home. States he gets his CPAP supplies from 
[employer]. States he does not have any outside community resources in the home.
 I questioned him about a glucometer, he states he does not have one and does 
not want one. States they are taking my sugar here and it is always good. 
Informed him that PT suggests home health. He states he does not want home 
health. States he will have Dr. Pride order it if he changes his mind. 
States "I can walk with my walker, this IV just gets in the way." CM will 
continue to follow and assist with DC planning/needs  
  
  
  
  
  
  
: Shirley Fong
DCP- Discharge Planning
 
Updated by [email or web address]: Shirley Ajit on 19   4:04 pm CT
Met with patient to discuss discharge planning, he asks me to return tomorrow 
for discharge planning. MOOM explained, signed, Will meet with him tomorrow. 
CM will continue to follow and assist with discharge planning/needs.
 DCPIA - Discharge Planning Initial Assessment
 
Updated by [email or web address]: Shirley Fong on 19   1:45 pm
*  Is the patient Alert and Oriented?
Yes
*  How many steps to enter\exit or inside your home? 3/0 *  PCP Dr. Pride *  Pharmacy [facility]
*  Preadmission Environment
Home with Family
*  ADLs
Partial Dependent
*  Partial ADLs (Assistance needed)
Ambulation
*  Equipment
Cane
CPAP
Walker
*  List name and contact numbers for known caregivers / representatives who 
currently or will assist patient after discharge:
Charo - wife - [phone]  Vic - son - [phone]
 
*  Verbal permission to speak to the caregivers and representatives has been 
obtained from the patient.
Yes
 
*  Community resources currently utilized
None
*  Please name any agencies selected above.
[facility]
*  Additional services required to return to the preadmission environment?
No
*  Can the patient safely return to the preadmission environment?
Yes
*  Has this patient been hospitalized within the prior 30 days at any 
hospital?
No
 
 
 
 
 
Coverage Notice
 
Reviewer: [email or web address] - Shirley Fong
 
Notice Issued Date-Time: 2019  17:04
Notice Type: Medicare Outpatient Observation Notice
 
Notice Delivered To: Patient
Relationship to Patient: Self
Representative Name: 
 
Delivery Method: HAND - Hand Delivered
Sandee Days:
Prior Verbal Notification: 
 
Recipient Understood Notice: Yes
Recipient Signature: Yes
Med Rec Note Co-signed by Attending:
 
Coverage Notice Comment:  IMM explained, signed, given, copy placed in MR
 
Last DP export: 19   2:30 p
Patient Name: FREDI LAWSON
 
Encounter #: [id number]
Page [id number]
 
 
 
 
 
Electronically Signed by ANDER ANTHONY on 02/15/19 at 1657
 
 
 
 
 
 
**All edits/amendments must be made on the electronic document**
 
DICTATION DATE: 02/15/19 1656     : GERALDINE  02/15/19 1656     
RPT#: [id number]                                DC DATE:19
                                               STATUS: DIS IN  
[facility]
 [place] [address]
***END OF REPORT***

## 2019-02-19 ENCOUNTER — HOSPITAL ENCOUNTER (OUTPATIENT)
Dept: HOSPITAL 84 - D.MRI | Age: 64
Discharge: HOME | End: 2019-02-19
Attending: INTERNAL MEDICINE
Payer: MEDICARE

## 2019-02-19 VITALS — BODY MASS INDEX: 49.4 KG/M2

## 2019-02-19 DIAGNOSIS — M54.5: Primary | ICD-10-CM
